# Patient Record
Sex: FEMALE | Race: WHITE
[De-identification: names, ages, dates, MRNs, and addresses within clinical notes are randomized per-mention and may not be internally consistent; named-entity substitution may affect disease eponyms.]

---

## 2020-08-10 ENCOUNTER — HOSPITAL ENCOUNTER (INPATIENT)
Dept: HOSPITAL 4 - SED | Age: 65
LOS: 6 days | Discharge: NURSING, REHAB OR LONG-TERM CARE | DRG: 871 | End: 2020-08-16
Attending: FAMILY MEDICINE | Admitting: FAMILY MEDICINE
Payer: COMMERCIAL

## 2020-08-10 VITALS — WEIGHT: 168.44 LBS | HEIGHT: 64 IN | SYSTOLIC BLOOD PRESSURE: 102 MMHG | BODY MASS INDEX: 28.76 KG/M2

## 2020-08-10 VITALS — SYSTOLIC BLOOD PRESSURE: 121 MMHG

## 2020-08-10 VITALS — SYSTOLIC BLOOD PRESSURE: 112 MMHG

## 2020-08-10 DIAGNOSIS — N18.5: ICD-10-CM

## 2020-08-10 DIAGNOSIS — K62.5: ICD-10-CM

## 2020-08-10 DIAGNOSIS — D63.8: ICD-10-CM

## 2020-08-10 DIAGNOSIS — K29.70: ICD-10-CM

## 2020-08-10 DIAGNOSIS — Z53.29: ICD-10-CM

## 2020-08-10 DIAGNOSIS — I48.0: ICD-10-CM

## 2020-08-10 DIAGNOSIS — E43: ICD-10-CM

## 2020-08-10 DIAGNOSIS — E03.9: ICD-10-CM

## 2020-08-10 DIAGNOSIS — F03.90: ICD-10-CM

## 2020-08-10 DIAGNOSIS — Z88.8: ICD-10-CM

## 2020-08-10 DIAGNOSIS — Z87.440: ICD-10-CM

## 2020-08-10 DIAGNOSIS — M54.9: ICD-10-CM

## 2020-08-10 DIAGNOSIS — I12.0: ICD-10-CM

## 2020-08-10 DIAGNOSIS — A41.02: Primary | ICD-10-CM

## 2020-08-10 DIAGNOSIS — G92: ICD-10-CM

## 2020-08-10 DIAGNOSIS — N31.9: ICD-10-CM

## 2020-08-10 DIAGNOSIS — E87.2: ICD-10-CM

## 2020-08-10 DIAGNOSIS — N17.9: ICD-10-CM

## 2020-08-10 DIAGNOSIS — K44.9: ICD-10-CM

## 2020-08-10 DIAGNOSIS — E78.5: ICD-10-CM

## 2020-08-10 DIAGNOSIS — E87.5: ICD-10-CM

## 2020-08-10 DIAGNOSIS — Z79.899: ICD-10-CM

## 2020-08-10 DIAGNOSIS — E86.0: ICD-10-CM

## 2020-08-10 DIAGNOSIS — N39.0: ICD-10-CM

## 2020-08-10 DIAGNOSIS — G89.29: ICD-10-CM

## 2020-08-10 DIAGNOSIS — F31.9: ICD-10-CM

## 2020-08-10 DIAGNOSIS — Z03.818: ICD-10-CM

## 2020-08-10 LAB
ALBUMIN SERPL BCP-MCNC: 3 G/DL (ref 3.4–4.8)
ALT SERPL W P-5'-P-CCNC: 23 U/L (ref 12–78)
ANION GAP SERPL CALCULATED.3IONS-SCNC: 21 MMOL/L (ref 5–15)
APPEARANCE UR: (no result)
AST SERPL W P-5'-P-CCNC: 20 U/L (ref 10–37)
BACTERIA URNS QL MICRO: (no result) /HPF
BASOPHILS # BLD AUTO: 0 K/UL (ref 0–0.2)
BASOPHILS NFR BLD AUTO: 0.1 % (ref 0–2)
BILIRUB SERPL-MCNC: 0.3 MG/DL (ref 0–1)
BILIRUB UR QL STRIP: NEGATIVE
BUN SERPL-MCNC: 104 MG/DL (ref 8–21)
CALCIUM SERPL-MCNC: 9.8 MG/DL (ref 8.4–11)
CHLORIDE SERPL-SCNC: 99 MMOL/L (ref 98–107)
COLOR UR: YELLOW
CREAT SERPL-MCNC: 10.8 MG/DL (ref 0.55–1.3)
EOSINOPHIL # BLD AUTO: 0 K/UL (ref 0–0.4)
EOSINOPHIL NFR BLD AUTO: 0 % (ref 0–4)
ERYTHROCYTE [DISTWIDTH] IN BLOOD BY AUTOMATED COUNT: 16 % (ref 9–15)
GFR SERPL CREATININE-BSD FRML MDRD: 5 ML/MIN (ref 90–?)
GLUCOSE SERPL-MCNC: 128 MG/DL (ref 70–99)
GLUCOSE UR STRIP-MCNC: NEGATIVE MG/DL
HCT VFR BLD AUTO: 32.3 % (ref 36–48)
HGB BLD-MCNC: 10.1 G/DL (ref 12–16)
HGB UR QL STRIP: (no result)
INR PPP: 0.9 (ref 0.8–1.2)
KETONES UR STRIP-MCNC: (no result) MG/DL
LEUKOCYTE ESTERASE UR QL STRIP: (no result)
LYMPHOCYTES # BLD AUTO: 0.7 K/UL (ref 1–5.5)
LYMPHOCYTES NFR BLD AUTO: 5.8 % (ref 20.5–51.5)
MCH RBC QN AUTO: 30 PG (ref 27–31)
MCHC RBC AUTO-ENTMCNC: 31 % (ref 32–36)
MCV RBC AUTO: 95 FL (ref 79–98)
MONOCYTES # BLD MANUAL: 0.4 K/UL (ref 0–1)
MONOCYTES # BLD MANUAL: 3.3 % (ref 1.7–9.3)
NEUTROPHILS # BLD AUTO: 11.2 K/UL (ref 1.8–7.7)
NEUTROPHILS NFR BLD AUTO: 90.8 % (ref 40–70)
NITRITE UR QL STRIP: NEGATIVE
PH UR STRIP: 6.5 [PH] (ref 5–8)
PLATELET # BLD AUTO: 340 K/UL (ref 130–430)
POTASSIUM SERPL-SCNC: 5.3 MMOL/L (ref 3.5–5.1)
PROT UR QL STRIP: (no result)
PROTHROMBIN TIME: 9.4 SECS (ref 9.5–12.5)
RBC # BLD AUTO: 3.38 MIL/UL (ref 4.2–6.2)
RBC #/AREA URNS HPF: (no result) /HPF (ref 0–3)
SODIUM SERPLBLD-SCNC: 131 MMOL/L (ref 136–145)
SP GR UR STRIP: 1.01 (ref 1–1.03)
UROBILINOGEN UR STRIP-MCNC: 0.2 MG/DL (ref 0.2–1)
WBC # BLD AUTO: 12.3 K/UL (ref 4.8–10.8)
WBC #/AREA URNS HPF: >100 /HPF (ref 0–3)

## 2020-08-10 PROCEDURE — C9113 INJ PANTOPRAZOLE SODIUM, VIA: HCPCS

## 2020-08-10 PROCEDURE — C1751 CATH, INF, PER/CENT/MIDLINE: HCPCS

## 2020-08-10 PROCEDURE — G0378 HOSPITAL OBSERVATION PER HR: HCPCS

## 2020-08-10 RX ADMIN — DEXTROSE AND SODIUM CHLORIDE SCH MLS/HR: 5; 900 INJECTION, SOLUTION INTRAVENOUS at 18:20

## 2020-08-10 RX ADMIN — SODIUM BICARBONATE TAB 650 MG SCH MG: 650 TAB at 21:09

## 2020-08-11 VITALS — SYSTOLIC BLOOD PRESSURE: 125 MMHG

## 2020-08-11 VITALS — SYSTOLIC BLOOD PRESSURE: 109 MMHG

## 2020-08-11 VITALS — SYSTOLIC BLOOD PRESSURE: 112 MMHG

## 2020-08-11 VITALS — SYSTOLIC BLOOD PRESSURE: 108 MMHG

## 2020-08-11 VITALS — SYSTOLIC BLOOD PRESSURE: 105 MMHG

## 2020-08-11 LAB
ALBUMIN SERPL BCP-MCNC: 2.4 G/DL (ref 3.4–4.8)
ALT SERPL W P-5'-P-CCNC: 22 U/L (ref 12–78)
ANION GAP SERPL CALCULATED.3IONS-SCNC: 17 MMOL/L (ref 5–15)
AST SERPL W P-5'-P-CCNC: 24 U/L (ref 10–37)
BASOPHILS # BLD AUTO: 0 K/UL (ref 0–0.2)
BASOPHILS NFR BLD AUTO: 0.1 % (ref 0–2)
BILIRUB SERPL-MCNC: 0.2 MG/DL (ref 0–1)
BUN SERPL-MCNC: 90 MG/DL (ref 8–21)
CALCIUM SERPL-MCNC: 8.6 MG/DL (ref 8.4–11)
CHLORIDE SERPL-SCNC: 108 MMOL/L (ref 98–107)
CREAT SERPL-MCNC: 9.62 MG/DL (ref 0.55–1.3)
EOSINOPHIL # BLD AUTO: 0 K/UL (ref 0–0.4)
EOSINOPHIL NFR BLD AUTO: 0 % (ref 0–4)
ERYTHROCYTE [DISTWIDTH] IN BLOOD BY AUTOMATED COUNT: 15.8 % (ref 9–15)
GFR SERPL CREATININE-BSD FRML MDRD: 5 ML/MIN (ref 90–?)
GLUCOSE SERPL-MCNC: 106 MG/DL (ref 70–99)
HCT VFR BLD AUTO: 29 % (ref 36–48)
HGB BLD-MCNC: 9.2 G/DL (ref 12–16)
LYMPHOCYTES # BLD AUTO: 0.8 K/UL (ref 1–5.5)
LYMPHOCYTES NFR BLD AUTO: 7 % (ref 20.5–51.5)
MCH RBC QN AUTO: 30 PG (ref 27–31)
MCHC RBC AUTO-ENTMCNC: 32 % (ref 32–36)
MCV RBC AUTO: 95 FL (ref 79–98)
MONOCYTES # BLD MANUAL: 0.5 K/UL (ref 0–1)
MONOCYTES # BLD MANUAL: 4 % (ref 1.7–9.3)
NEUTROPHILS # BLD AUTO: 10.6 K/UL (ref 1.8–7.7)
NEUTROPHILS NFR BLD AUTO: 88.9 % (ref 40–70)
PHOSPHATE SERPL-MCNC: 4.7 MG/DL (ref 2.7–4.5)
PLATELET # BLD AUTO: 302 K/UL (ref 130–430)
POTASSIUM SERPL-SCNC: 5.2 MMOL/L (ref 3.5–5.1)
RBC # BLD AUTO: 3.06 MIL/UL (ref 4.2–6.2)
SODIUM SERPLBLD-SCNC: 140 MMOL/L (ref 136–145)
SODIUM UR-SCNC: 69 MMOL/L (ref 40–220)
TSH SERPL DL<=0.05 MIU/L-ACNC: 0.41 UIU/ML (ref 0.36–3.74)
WBC # BLD AUTO: 11.9 K/UL (ref 4.8–10.8)

## 2020-08-11 RX ADMIN — SODIUM BICARBONATE TAB 650 MG SCH MG: 650 TAB at 15:45

## 2020-08-11 RX ADMIN — DEXTROSE AND SODIUM CHLORIDE SCH MLS/HR: 5; 900 INJECTION, SOLUTION INTRAVENOUS at 01:30

## 2020-08-11 RX ADMIN — SODIUM BICARBONATE TAB 650 MG SCH MG: 650 TAB at 20:57

## 2020-08-11 RX ADMIN — DEXTROSE AND SODIUM CHLORIDE SCH MLS/HR: 5; 900 INJECTION, SOLUTION INTRAVENOUS at 18:05

## 2020-08-11 RX ADMIN — SODIUM BICARBONATE TAB 650 MG SCH MG: 650 TAB at 09:10

## 2020-08-11 RX ADMIN — DEXTROSE MONOHYDRATE SCH MLS/HR: 50 INJECTION, SOLUTION INTRAVENOUS at 15:44

## 2020-08-11 RX ADMIN — DEXTROSE AND SODIUM CHLORIDE SCH MLS/HR: 5; 900 INJECTION, SOLUTION INTRAVENOUS at 09:10

## 2020-08-11 RX ADMIN — LEVOTHYROXINE SODIUM SCH MG: 100 TABLET ORAL at 06:01

## 2020-08-12 VITALS — SYSTOLIC BLOOD PRESSURE: 115 MMHG

## 2020-08-12 VITALS — SYSTOLIC BLOOD PRESSURE: 101 MMHG

## 2020-08-12 VITALS — SYSTOLIC BLOOD PRESSURE: 126 MMHG

## 2020-08-12 VITALS — SYSTOLIC BLOOD PRESSURE: 118 MMHG

## 2020-08-12 VITALS — SYSTOLIC BLOOD PRESSURE: 96 MMHG

## 2020-08-12 LAB
ALBUMIN SERPL BCP-MCNC: 2.3 G/DL (ref 3.4–4.8)
ALT SERPL W P-5'-P-CCNC: 30 U/L (ref 12–78)
ANION GAP SERPL CALCULATED.3IONS-SCNC: 20 MMOL/L (ref 5–15)
ANION GAP SERPL CALCULATED.3IONS-SCNC: 21 MMOL/L (ref 5–15)
AST SERPL W P-5'-P-CCNC: 37 U/L (ref 10–37)
BASOPHILS # BLD AUTO: 0 K/UL (ref 0–0.2)
BASOPHILS # BLD AUTO: 0 K/UL (ref 0–0.2)
BASOPHILS NFR BLD AUTO: 0.2 % (ref 0–2)
BASOPHILS NFR BLD AUTO: 0.2 % (ref 0–2)
BILIRUB SERPL-MCNC: 0.2 MG/DL (ref 0–1)
BUN SERPL-MCNC: 80 MG/DL (ref 8–21)
BUN SERPL-MCNC: 82 MG/DL (ref 8–21)
CALCIUM SERPL-MCNC: 8.6 MG/DL (ref 8.4–11)
CALCIUM SERPL-MCNC: 8.6 MG/DL (ref 8.4–11)
CHLORIDE SERPL-SCNC: 106 MMOL/L (ref 98–107)
CHLORIDE SERPL-SCNC: 107 MMOL/L (ref 98–107)
CREAT SERPL-MCNC: 8.28 MG/DL (ref 0.55–1.3)
CREAT SERPL-MCNC: 8.46 MG/DL (ref 0.55–1.3)
EOSINOPHIL # BLD AUTO: 0 K/UL (ref 0–0.4)
EOSINOPHIL # BLD AUTO: 0.1 K/UL (ref 0–0.4)
EOSINOPHIL NFR BLD AUTO: 0.5 % (ref 0–4)
EOSINOPHIL NFR BLD AUTO: 1.3 % (ref 0–4)
ERYTHROCYTE [DISTWIDTH] IN BLOOD BY AUTOMATED COUNT: 16.4 % (ref 9–15)
ERYTHROCYTE [DISTWIDTH] IN BLOOD BY AUTOMATED COUNT: 16.6 % (ref 9–15)
GFR SERPL CREATININE-BSD FRML MDRD: 6 ML/MIN (ref 90–?)
GFR SERPL CREATININE-BSD FRML MDRD: 6 ML/MIN (ref 90–?)
GLUCOSE SERPL-MCNC: 85 MG/DL (ref 70–99)
GLUCOSE SERPL-MCNC: 87 MG/DL (ref 70–99)
HCT VFR BLD AUTO: 28 % (ref 36–48)
HCT VFR BLD AUTO: 28.1 % (ref 36–48)
HGB BLD-MCNC: 8.7 G/DL (ref 12–16)
HGB BLD-MCNC: 9.1 G/DL (ref 12–16)
LYMPHOCYTES # BLD AUTO: 1.2 K/UL (ref 1–5.5)
LYMPHOCYTES # BLD AUTO: 1.3 K/UL (ref 1–5.5)
LYMPHOCYTES NFR BLD AUTO: 15.4 % (ref 20.5–51.5)
LYMPHOCYTES NFR BLD AUTO: 18.3 % (ref 20.5–51.5)
MCH RBC QN AUTO: 30 PG (ref 27–31)
MCH RBC QN AUTO: 31 PG (ref 27–31)
MCHC RBC AUTO-ENTMCNC: 31 % (ref 32–36)
MCHC RBC AUTO-ENTMCNC: 32 % (ref 32–36)
MCV RBC AUTO: 94 FL (ref 79–98)
MCV RBC AUTO: 95 FL (ref 79–98)
MONOCYTES # BLD MANUAL: 0.3 K/UL (ref 0–1)
MONOCYTES # BLD MANUAL: 0.4 K/UL (ref 0–1)
MONOCYTES # BLD MANUAL: 4.7 % (ref 1.7–9.3)
MONOCYTES # BLD MANUAL: 5 % (ref 1.7–9.3)
NEUTROPHILS # BLD AUTO: 5 K/UL (ref 1.8–7.7)
NEUTROPHILS # BLD AUTO: 6.4 K/UL (ref 1.8–7.7)
NEUTROPHILS NFR BLD AUTO: 75.2 % (ref 40–70)
NEUTROPHILS NFR BLD AUTO: 79.2 % (ref 40–70)
PLATELET # BLD AUTO: 273 K/UL (ref 130–430)
PLATELET # BLD AUTO: 277 K/UL (ref 130–430)
POTASSIUM SERPL-SCNC: 4.1 MMOL/L (ref 3.5–5.1)
POTASSIUM SERPL-SCNC: 4.2 MMOL/L (ref 3.5–5.1)
RBC # BLD AUTO: 2.96 MIL/UL (ref 4.2–6.2)
RBC # BLD AUTO: 2.96 MIL/UL (ref 4.2–6.2)
SODIUM SERPLBLD-SCNC: 140 MMOL/L (ref 136–145)
SODIUM SERPLBLD-SCNC: 141 MMOL/L (ref 136–145)
WBC # BLD AUTO: 6.6 K/UL (ref 4.8–10.8)
WBC # BLD AUTO: 8.1 K/UL (ref 4.8–10.8)

## 2020-08-12 RX ADMIN — DEXTROSE MONOHYDRATE SCH MLS/HR: 50 INJECTION, SOLUTION INTRAVENOUS at 14:34

## 2020-08-12 RX ADMIN — LEVOTHYROXINE SODIUM SCH MG: 100 TABLET ORAL at 06:38

## 2020-08-12 RX ADMIN — SODIUM BICARBONATE TAB 650 MG SCH MG: 650 TAB at 09:15

## 2020-08-12 RX ADMIN — DEXTROSE AND SODIUM CHLORIDE SCH MLS/HR: 5; 900 INJECTION, SOLUTION INTRAVENOUS at 14:35

## 2020-08-12 RX ADMIN — SODIUM BICARBONATE TAB 650 MG SCH MG: 650 TAB at 14:34

## 2020-08-12 RX ADMIN — HYDROMORPHONE HYDROCHLORIDE PRN MG: 8 TABLET ORAL at 20:11

## 2020-08-12 RX ADMIN — DEXTROSE AND SODIUM CHLORIDE SCH MLS/HR: 5; 900 INJECTION, SOLUTION INTRAVENOUS at 01:30

## 2020-08-12 RX ADMIN — SODIUM BICARBONATE TAB 650 MG SCH MG: 650 TAB at 20:08

## 2020-08-12 RX ADMIN — DEXTROSE AND SODIUM CHLORIDE SCH MLS/HR: 5; 900 INJECTION, SOLUTION INTRAVENOUS at 17:30

## 2020-08-13 VITALS — SYSTOLIC BLOOD PRESSURE: 118 MMHG

## 2020-08-13 VITALS — SYSTOLIC BLOOD PRESSURE: 111 MMHG

## 2020-08-13 VITALS — SYSTOLIC BLOOD PRESSURE: 122 MMHG

## 2020-08-13 VITALS — SYSTOLIC BLOOD PRESSURE: 110 MMHG

## 2020-08-13 VITALS — SYSTOLIC BLOOD PRESSURE: 107 MMHG

## 2020-08-13 LAB
ALBUMIN SERPL BCP-MCNC: 1.9 G/DL (ref 3.4–4.8)
ALT SERPL W P-5'-P-CCNC: 26 U/L (ref 12–78)
ANION GAP SERPL CALCULATED.3IONS-SCNC: 17 MMOL/L (ref 5–15)
AST SERPL W P-5'-P-CCNC: 27 U/L (ref 10–37)
BASOPHILS # BLD AUTO: 0 K/UL (ref 0–0.2)
BASOPHILS NFR BLD AUTO: 0.6 % (ref 0–2)
BILIRUB SERPL-MCNC: 0.2 MG/DL (ref 0–1)
BUN SERPL-MCNC: 78 MG/DL (ref 8–21)
CALCIUM SERPL-MCNC: 7.7 MG/DL (ref 8.4–11)
CHLORIDE SERPL-SCNC: 108 MMOL/L (ref 98–107)
CREAT SERPL-MCNC: 7.36 MG/DL (ref 0.55–1.3)
CREAT UR-MCNC: 103 MG/DL
EOSINOPHIL # BLD AUTO: 0.2 K/UL (ref 0–0.4)
EOSINOPHIL NFR BLD AUTO: 3 % (ref 0–4)
ERYTHROCYTE [DISTWIDTH] IN BLOOD BY AUTOMATED COUNT: 16.3 % (ref 9–15)
GFR SERPL CREATININE-BSD FRML MDRD: 7 ML/MIN (ref 90–?)
GLUCOSE SERPL-MCNC: 106 MG/DL (ref 70–99)
HCT VFR BLD AUTO: 22.9 % (ref 36–48)
HGB BLD-MCNC: 7.3 G/DL (ref 12–16)
LYMPHOCYTES # BLD AUTO: 1.1 K/UL (ref 1–5.5)
LYMPHOCYTES NFR BLD AUTO: 20.7 % (ref 20.5–51.5)
MCH RBC QN AUTO: 30 PG (ref 27–31)
MCHC RBC AUTO-ENTMCNC: 32 % (ref 32–36)
MCV RBC AUTO: 94 FL (ref 79–98)
MICROALBUMIN UR-MCNC: 247.5 UG/ML
MICROALBUMIN/CREAT UR: 240 MG/G CRE (ref 0–30)
MONOCYTES # BLD MANUAL: 0.5 K/UL (ref 0–1)
MONOCYTES # BLD MANUAL: 8.6 % (ref 1.7–9.3)
NEUTROPHILS # BLD AUTO: 3.7 K/UL (ref 1.8–7.7)
NEUTROPHILS NFR BLD AUTO: 67.1 % (ref 40–70)
PLATELET # BLD AUTO: 245 K/UL (ref 130–430)
POTASSIUM SERPL-SCNC: 3.5 MMOL/L (ref 3.5–5.1)
RBC # BLD AUTO: 2.45 MIL/UL (ref 4.2–6.2)
SODIUM SERPLBLD-SCNC: 141 MMOL/L (ref 136–145)
TIBC SERPL-MCNC: 85 UG/DL (ref 250–450)
WBC # BLD AUTO: 5.5 K/UL (ref 4.8–10.8)

## 2020-08-13 RX ADMIN — SODIUM BICARBONATE TAB 650 MG SCH MG: 650 TAB at 21:05

## 2020-08-13 RX ADMIN — HYDROMORPHONE HYDROCHLORIDE PRN MG: 8 TABLET ORAL at 14:48

## 2020-08-13 RX ADMIN — SODIUM CHLORIDE SCH MG: 9 INJECTION, SOLUTION INTRAVENOUS at 08:43

## 2020-08-13 RX ADMIN — ERYTHROPOIETIN SCH UNITS: 10000 INJECTION, SOLUTION INTRAVENOUS; SUBCUTANEOUS at 17:54

## 2020-08-13 RX ADMIN — LEVOTHYROXINE SODIUM SCH MG: 100 TABLET ORAL at 06:01

## 2020-08-13 RX ADMIN — DEXTROSE AND SODIUM CHLORIDE SCH MLS/HR: 5; 900 INJECTION, SOLUTION INTRAVENOUS at 08:44

## 2020-08-13 RX ADMIN — SODIUM BICARBONATE TAB 650 MG SCH MG: 650 TAB at 14:47

## 2020-08-13 RX ADMIN — DEXTROSE AND SODIUM CHLORIDE SCH MLS/HR: 5; 900 INJECTION, SOLUTION INTRAVENOUS at 00:30

## 2020-08-13 RX ADMIN — HYDROMORPHONE HYDROCHLORIDE PRN MG: 8 TABLET ORAL at 21:08

## 2020-08-13 RX ADMIN — DEXTROSE MONOHYDRATE SCH MLS/HR: 50 INJECTION, SOLUTION INTRAVENOUS at 14:47

## 2020-08-13 RX ADMIN — DEXTROSE AND SODIUM CHLORIDE SCH MLS/HR: 5; 900 INJECTION, SOLUTION INTRAVENOUS at 17:54

## 2020-08-13 RX ADMIN — SODIUM BICARBONATE TAB 650 MG SCH MG: 650 TAB at 08:43

## 2020-08-14 VITALS — SYSTOLIC BLOOD PRESSURE: 123 MMHG

## 2020-08-14 VITALS — SYSTOLIC BLOOD PRESSURE: 125 MMHG

## 2020-08-14 VITALS — SYSTOLIC BLOOD PRESSURE: 116 MMHG

## 2020-08-14 VITALS — SYSTOLIC BLOOD PRESSURE: 103 MMHG

## 2020-08-14 VITALS — SYSTOLIC BLOOD PRESSURE: 107 MMHG

## 2020-08-14 LAB
ANION GAP SERPL CALCULATED.3IONS-SCNC: 13 MMOL/L (ref 5–15)
BASOPHILS # BLD AUTO: 0 K/UL (ref 0–0.2)
BASOPHILS NFR BLD AUTO: 0.3 % (ref 0–2)
BUN SERPL-MCNC: 66 MG/DL (ref 8–21)
CALCIUM SERPL-MCNC: 7.8 MG/DL (ref 8.4–11)
CHLORIDE SERPL-SCNC: 109 MMOL/L (ref 98–107)
CREAT SERPL-MCNC: 6.26 MG/DL (ref 0.55–1.3)
EOSINOPHIL # BLD AUTO: 0.2 K/UL (ref 0–0.4)
EOSINOPHIL NFR BLD AUTO: 4.7 % (ref 0–4)
ERYTHROCYTE [DISTWIDTH] IN BLOOD BY AUTOMATED COUNT: 16.5 % (ref 9–15)
FOLATE (FOLIC ACID): 12.5 NG/ML (ref 3–?)
GFR SERPL CREATININE-BSD FRML MDRD: 9 ML/MIN (ref 90–?)
GLUCOSE SERPL-MCNC: 108 MG/DL (ref 70–99)
HCT VFR BLD AUTO: 24.2 % (ref 36–48)
HGB BLD-MCNC: 7.6 G/DL (ref 12–16)
INR PPP: 0.9 (ref 0.8–1.2)
LYMPHOCYTES # BLD AUTO: 1.5 K/UL (ref 1–5.5)
LYMPHOCYTES NFR BLD AUTO: 31.3 % (ref 20.5–51.5)
MCH RBC QN AUTO: 30 PG (ref 27–31)
MCHC RBC AUTO-ENTMCNC: 31 % (ref 32–36)
MCV RBC AUTO: 95 FL (ref 79–98)
MONOCYTES # BLD MANUAL: 0.5 K/UL (ref 0–1)
MONOCYTES # BLD MANUAL: 9.7 % (ref 1.7–9.3)
NEUTROPHILS # BLD AUTO: 2.6 K/UL (ref 1.8–7.7)
NEUTROPHILS NFR BLD AUTO: 54 % (ref 40–70)
PLATELET # BLD AUTO: 251 K/UL (ref 130–430)
POTASSIUM SERPL-SCNC: 3.5 MMOL/L (ref 3.5–5.1)
PROTHROMBIN TIME: 9.5 SECS (ref 9.5–12.5)
RBC # BLD AUTO: 2.56 MIL/UL (ref 4.2–6.2)
SODIUM SERPLBLD-SCNC: 139 MMOL/L (ref 136–145)
VIT B12 SERPL-MCNC: 1015 PG/ML (ref 232–1245)
WBC # BLD AUTO: 4.9 K/UL (ref 4.8–10.8)

## 2020-08-14 PROCEDURE — 0DB68ZX EXCISION OF STOMACH, VIA NATURAL OR ARTIFICIAL OPENING ENDOSCOPIC, DIAGNOSTIC: ICD-10-PCS | Performed by: INTERNAL MEDICINE

## 2020-08-14 PROCEDURE — 0DB98ZX EXCISION OF DUODENUM, VIA NATURAL OR ARTIFICIAL OPENING ENDOSCOPIC, DIAGNOSTIC: ICD-10-PCS | Performed by: INTERNAL MEDICINE

## 2020-08-14 RX ADMIN — MIDAZOLAM HYDROCHLORIDE ONE MG: 1 INJECTION, SOLUTION INTRAMUSCULAR; INTRAVENOUS at 11:05

## 2020-08-14 RX ADMIN — SODIUM CHLORIDE SCH MG: 9 INJECTION, SOLUTION INTRAVENOUS at 09:07

## 2020-08-14 RX ADMIN — SODIUM BICARBONATE TAB 650 MG SCH MG: 650 TAB at 09:00

## 2020-08-14 RX ADMIN — MIDAZOLAM HYDROCHLORIDE ONE MG: 1 INJECTION, SOLUTION INTRAMUSCULAR; INTRAVENOUS at 11:10

## 2020-08-14 RX ADMIN — DEXTROSE AND SODIUM CHLORIDE SCH MLS/HR: 5; 900 INJECTION, SOLUTION INTRAVENOUS at 01:31

## 2020-08-14 RX ADMIN — MEPERIDINE HYDROCHLORIDE ONE MG: 100 INJECTION INTRAMUSCULAR; INTRAVENOUS; SUBCUTANEOUS at 11:05

## 2020-08-14 RX ADMIN — MEPERIDINE HYDROCHLORIDE ONE MG: 100 INJECTION INTRAMUSCULAR; INTRAVENOUS; SUBCUTANEOUS at 11:10

## 2020-08-14 RX ADMIN — DEXTROSE AND SODIUM CHLORIDE SCH MLS/HR: 5; 900 INJECTION, SOLUTION INTRAVENOUS at 09:08

## 2020-08-14 RX ADMIN — LEVOTHYROXINE SODIUM SCH MG: 100 TABLET ORAL at 06:32

## 2020-08-14 RX ADMIN — SODIUM BICARBONATE TAB 650 MG SCH MG: 650 TAB at 20:27

## 2020-08-14 RX ADMIN — LINEZOLID SCH MG: 600 TABLET, FILM COATED ORAL at 20:27

## 2020-08-14 RX ADMIN — SODIUM BICARBONATE TAB 650 MG SCH MG: 650 TAB at 14:03

## 2020-08-15 VITALS — SYSTOLIC BLOOD PRESSURE: 106 MMHG

## 2020-08-15 VITALS — SYSTOLIC BLOOD PRESSURE: 107 MMHG

## 2020-08-15 VITALS — SYSTOLIC BLOOD PRESSURE: 108 MMHG

## 2020-08-15 VITALS — SYSTOLIC BLOOD PRESSURE: 120 MMHG

## 2020-08-15 VITALS — SYSTOLIC BLOOD PRESSURE: 103 MMHG

## 2020-08-15 LAB
ANION GAP SERPL CALCULATED.3IONS-SCNC: 14 MMOL/L (ref 5–15)
BASOPHILS # BLD AUTO: 0 K/UL (ref 0–0.2)
BASOPHILS NFR BLD AUTO: 0.6 % (ref 0–2)
BUN SERPL-MCNC: 59 MG/DL (ref 8–21)
CALCIUM SERPL-MCNC: 8.4 MG/DL (ref 8.4–11)
CHLORIDE SERPL-SCNC: 107 MMOL/L (ref 98–107)
CREAT SERPL-MCNC: 5.79 MG/DL (ref 0.55–1.3)
EOSINOPHIL # BLD AUTO: 0.2 K/UL (ref 0–0.4)
EOSINOPHIL NFR BLD AUTO: 5.2 % (ref 0–4)
ERYTHROCYTE [DISTWIDTH] IN BLOOD BY AUTOMATED COUNT: 16.5 % (ref 9–15)
FERRITIN: 647 NG/ML (ref 15–150)
GFR SERPL CREATININE-BSD FRML MDRD: 9 ML/MIN (ref 90–?)
GLUCOSE SERPL-MCNC: 93 MG/DL (ref 70–99)
HCT VFR BLD AUTO: 25.3 % (ref 36–48)
HGB BLD-MCNC: 7.8 G/DL (ref 12–16)
LYMPHOCYTES # BLD AUTO: 1.1 K/UL (ref 1–5.5)
LYMPHOCYTES NFR BLD AUTO: 28.2 % (ref 20.5–51.5)
MCH RBC QN AUTO: 29 PG (ref 27–31)
MCHC RBC AUTO-ENTMCNC: 31 % (ref 32–36)
MCV RBC AUTO: 95 FL (ref 79–98)
MONOCYTES # BLD MANUAL: 0.4 K/UL (ref 0–1)
MONOCYTES # BLD MANUAL: 9.1 % (ref 1.7–9.3)
NEUTROPHILS # BLD AUTO: 2.2 K/UL (ref 1.8–7.7)
NEUTROPHILS NFR BLD AUTO: 56.9 % (ref 40–70)
PLATELET # BLD AUTO: 237 K/UL (ref 130–430)
POTASSIUM SERPL-SCNC: 3.4 MMOL/L (ref 3.5–5.1)
RBC # BLD AUTO: 2.67 MIL/UL (ref 4.2–6.2)
SODIUM SERPLBLD-SCNC: 137 MMOL/L (ref 136–145)
WBC # BLD AUTO: 3.9 K/UL (ref 4.8–10.8)

## 2020-08-15 PROCEDURE — B548ZZA ULTRASONOGRAPHY OF SUPERIOR VENA CAVA, GUIDANCE: ICD-10-PCS

## 2020-08-15 PROCEDURE — 0JH63XZ INSERTION OF TUNNELED VASCULAR ACCESS DEVICE INTO CHEST SUBCUTANEOUS TISSUE AND FASCIA, PERCUTANEOUS APPROACH: ICD-10-PCS

## 2020-08-15 PROCEDURE — 5A1D70Z PERFORMANCE OF URINARY FILTRATION, INTERMITTENT, LESS THAN 6 HOURS PER DAY: ICD-10-PCS | Performed by: FAMILY MEDICINE

## 2020-08-15 PROCEDURE — 02H633Z INSERTION OF INFUSION DEVICE INTO RIGHT ATRIUM, PERCUTANEOUS APPROACH: ICD-10-PCS

## 2020-08-15 PROCEDURE — B518ZZA FLUOROSCOPY OF SUPERIOR VENA CAVA, GUIDANCE: ICD-10-PCS

## 2020-08-15 RX ADMIN — SODIUM CHLORIDE SCH MG: 9 INJECTION, SOLUTION INTRAVENOUS at 08:53

## 2020-08-15 RX ADMIN — SODIUM BICARBONATE TAB 650 MG SCH MG: 650 TAB at 08:53

## 2020-08-15 RX ADMIN — LEVOTHYROXINE SODIUM SCH MG: 100 TABLET ORAL at 06:30

## 2020-08-15 RX ADMIN — HYDROMORPHONE HYDROCHLORIDE PRN MG: 8 TABLET ORAL at 10:28

## 2020-08-15 RX ADMIN — LINEZOLID SCH MG: 600 TABLET, FILM COATED ORAL at 08:53

## 2020-08-15 RX ADMIN — SODIUM BICARBONATE TAB 650 MG SCH MG: 650 TAB at 13:38

## 2020-08-16 VITALS — SYSTOLIC BLOOD PRESSURE: 90 MMHG

## 2020-08-16 VITALS — SYSTOLIC BLOOD PRESSURE: 92 MMHG

## 2020-08-16 VITALS — SYSTOLIC BLOOD PRESSURE: 94 MMHG

## 2020-08-16 VITALS — SYSTOLIC BLOOD PRESSURE: 128 MMHG

## 2020-08-16 VITALS — SYSTOLIC BLOOD PRESSURE: 99 MMHG

## 2020-08-16 LAB
ANION GAP SERPL CALCULATED.3IONS-SCNC: 7 MMOL/L (ref 5–15)
BASOPHILS # BLD AUTO: 0 K/UL (ref 0–0.2)
BASOPHILS NFR BLD AUTO: 0.4 % (ref 0–2)
BUN SERPL-MCNC: 20 MG/DL (ref 8–21)
CALCIUM SERPL-MCNC: 7.7 MG/DL (ref 8.4–11)
CHLORIDE SERPL-SCNC: 105 MMOL/L (ref 98–107)
CREAT SERPL-MCNC: 2.73 MG/DL (ref 0.55–1.3)
EOSINOPHIL # BLD AUTO: 0.1 K/UL (ref 0–0.4)
EOSINOPHIL NFR BLD AUTO: 2.8 % (ref 0–4)
ERYTHROCYTE [DISTWIDTH] IN BLOOD BY AUTOMATED COUNT: 16 % (ref 9–15)
GFR SERPL CREATININE-BSD FRML MDRD: 22 ML/MIN (ref 90–?)
GLUCOSE SERPL-MCNC: 87 MG/DL (ref 70–99)
HCT VFR BLD AUTO: 23.8 % (ref 36–48)
HGB BLD-MCNC: 7.5 G/DL (ref 12–16)
LYMPHOCYTES # BLD AUTO: 1 K/UL (ref 1–5.5)
LYMPHOCYTES NFR BLD AUTO: 24.9 % (ref 20.5–51.5)
MCH RBC QN AUTO: 29 PG (ref 27–31)
MCHC RBC AUTO-ENTMCNC: 31 % (ref 32–36)
MCV RBC AUTO: 93 FL (ref 79–98)
MONOCYTES # BLD MANUAL: 0.5 K/UL (ref 0–1)
MONOCYTES # BLD MANUAL: 11.8 % (ref 1.7–9.3)
NEUTROPHILS # BLD AUTO: 2.4 K/UL (ref 1.8–7.7)
NEUTROPHILS NFR BLD AUTO: 60.1 % (ref 40–70)
PLATELET # BLD AUTO: 237 K/UL (ref 130–430)
POTASSIUM SERPL-SCNC: 3.1 MMOL/L (ref 3.5–5.1)
RBC # BLD AUTO: 2.57 MIL/UL (ref 4.2–6.2)
SODIUM SERPLBLD-SCNC: 139 MMOL/L (ref 136–145)
WBC # BLD AUTO: 4.1 K/UL (ref 4.8–10.8)

## 2020-08-16 RX ADMIN — ERYTHROPOIETIN SCH UNITS: 10000 INJECTION, SOLUTION INTRAVENOUS; SUBCUTANEOUS at 00:17

## 2020-08-16 RX ADMIN — SODIUM CHLORIDE SCH MG: 9 INJECTION, SOLUTION INTRAVENOUS at 08:05

## 2020-08-16 RX ADMIN — LEVOTHYROXINE SODIUM SCH MG: 100 TABLET ORAL at 06:05

## 2020-08-16 RX ADMIN — LINEZOLID SCH MG: 600 TABLET, FILM COATED ORAL at 08:05

## 2020-08-16 RX ADMIN — SODIUM BICARBONATE TAB 650 MG SCH MG: 650 TAB at 16:21

## 2020-08-16 RX ADMIN — SODIUM BICARBONATE TAB 650 MG SCH MG: 650 TAB at 00:15

## 2020-08-16 RX ADMIN — SODIUM BICARBONATE TAB 650 MG SCH MG: 650 TAB at 08:06

## 2020-08-16 RX ADMIN — LINEZOLID SCH MG: 600 TABLET, FILM COATED ORAL at 00:18

## 2020-08-16 RX ADMIN — HYDROMORPHONE HYDROCHLORIDE PRN MG: 8 TABLET ORAL at 00:18

## 2021-07-22 ENCOUNTER — HOSPITAL ENCOUNTER (INPATIENT)
Dept: HOSPITAL 4 - SED | Age: 66
LOS: 3 days | Discharge: HOME | DRG: 673 | End: 2021-07-25
Attending: FAMILY MEDICINE | Admitting: FAMILY MEDICINE
Payer: COMMERCIAL

## 2021-07-22 VITALS — SYSTOLIC BLOOD PRESSURE: 106 MMHG

## 2021-07-22 VITALS — WEIGHT: 186.31 LBS | HEIGHT: 62 IN | SYSTOLIC BLOOD PRESSURE: 117 MMHG | BODY MASS INDEX: 34.29 KG/M2

## 2021-07-22 VITALS — SYSTOLIC BLOOD PRESSURE: 134 MMHG

## 2021-07-22 VITALS — SYSTOLIC BLOOD PRESSURE: 143 MMHG

## 2021-07-22 DIAGNOSIS — N18.6: ICD-10-CM

## 2021-07-22 DIAGNOSIS — Z20.822: ICD-10-CM

## 2021-07-22 DIAGNOSIS — D64.9: ICD-10-CM

## 2021-07-22 DIAGNOSIS — E03.9: ICD-10-CM
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
## 2021-07-22 DIAGNOSIS — E78.5: ICD-10-CM

## 2021-07-22 DIAGNOSIS — D72.819: ICD-10-CM
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
## 2021-07-22 DIAGNOSIS — E83.42: ICD-10-CM

## 2021-07-22 DIAGNOSIS — T82.510A: ICD-10-CM

## 2021-07-22 DIAGNOSIS — Z99.2: ICD-10-CM

## 2021-07-22 DIAGNOSIS — M89.9: ICD-10-CM

## 2021-07-22 DIAGNOSIS — T82.41XA: Primary | ICD-10-CM

## 2021-07-22 DIAGNOSIS — Z79.890: ICD-10-CM

## 2021-07-22 DIAGNOSIS — I12.0: ICD-10-CM

## 2021-07-22 LAB
ALBUMIN SERPL BCP-MCNC: 3.7 G/DL (ref 3.4–4.8)
ALT SERPL W P-5'-P-CCNC: 19 U/L (ref 12–78)
ANION GAP SERPL CALCULATED.3IONS-SCNC: 10 MMOL/L (ref 5–15)
AST SERPL W P-5'-P-CCNC: 18 U/L (ref 10–37)
BASOPHILS # BLD AUTO: 0 K/UL (ref 0–0.2)
BASOPHILS NFR BLD AUTO: 0.6 % (ref 0–2)
BILIRUB SERPL-MCNC: 0.1 MG/DL (ref 0–1)
BUN SERPL-MCNC: 47 MG/DL (ref 8–21)
CALCIUM SERPL-MCNC: 8.8 MG/DL (ref 8.4–11)
CHLORIDE SERPL-SCNC: 101 MMOL/L (ref 98–107)
CREAT SERPL-MCNC: 5.09 MG/DL (ref 0.55–1.3)
EOSINOPHIL # BLD AUTO: 0.1 K/UL (ref 0–0.4)
EOSINOPHIL NFR BLD AUTO: 2.2 % (ref 0–4)
ERYTHROCYTE [DISTWIDTH] IN BLOOD BY AUTOMATED COUNT: 15.4 % (ref 9–15)
GFR SERPL CREATININE-BSD FRML MDRD: 11 ML/MIN (ref 90–?)
GLUCOSE SERPL-MCNC: 90 MG/DL (ref 70–99)
HCT VFR BLD AUTO: 36.6 % (ref 36–48)
HGB BLD-MCNC: 12 G/DL (ref 12–16)
INR PPP: 0.9 (ref 0.8–1.2)
LYMPHOCYTES # BLD AUTO: 1.8 K/UL (ref 1–5.5)
LYMPHOCYTES NFR BLD AUTO: 38.4 % (ref 20.5–51.5)
MCH RBC QN AUTO: 34 PG (ref 27–31)
MCHC RBC AUTO-ENTMCNC: 33 % (ref 32–36)
MCV RBC AUTO: 104 FL (ref 79–98)
MONOCYTES # BLD MANUAL: 0.4 K/UL (ref 0–1)
MONOCYTES # BLD MANUAL: 8.6 % (ref 1.7–9.3)
NEUTROPHILS # BLD AUTO: 2.3 K/UL (ref 1.8–7.7)
NEUTROPHILS NFR BLD AUTO: 50.2 % (ref 40–70)
PLATELET # BLD AUTO: 193 K/UL (ref 130–430)
POTASSIUM SERPL-SCNC: 4.7 MMOL/L (ref 3.5–5.1)
PROTHROMBIN TIME: 9.7 SECS (ref 9.5–12.5)
RBC # BLD AUTO: 3.52 MIL/UL (ref 4.2–6.2)
SODIUM SERPLBLD-SCNC: 136 MMOL/L (ref 136–145)
WBC # BLD AUTO: 4.6 K/UL (ref 4.8–10.8)

## 2021-07-22 PROCEDURE — C1750 CATH, HEMODIALYSIS,LONG-TERM: HCPCS

## 2021-07-22 PROCEDURE — 5A1D80Z PERFORMANCE OF URINARY FILTRATION, PROLONGED INTERMITTENT, 6-18 HOURS PER DAY: ICD-10-PCS | Performed by: INTERNAL MEDICINE

## 2021-07-22 RX ADMIN — SIMVASTATIN SCH MG: 10 TABLET, FILM COATED ORAL at 20:54

## 2021-07-22 RX ADMIN — Medication SCH ML: at 21:33

## 2021-07-22 RX ADMIN — OXCARBAZEPINE SCH MG: 150 TABLET, FILM COATED ORAL at 20:57

## 2021-07-22 RX ADMIN — SUCRALFATE SCH GM: 1 TABLET ORAL at 20:58

## 2021-07-22 RX ADMIN — SUCRALFATE SCH GM: 1 TABLET ORAL at 17:42

## 2021-07-22 NOTE — NUR
DR SHERIFF AT BEDSIDE

      DR SHERIFF CAME TO SEE PATIENT AND SUTURES THE PATIENT'S PERMACATHETER. FLUSHED AND CHECKED 
 PERMA CATH FOR OCCLUSION, NO OCCLUSION NOTED. RED FOR IN AND BLUE FOR OUT FOR DIALYSIS.

## 2021-07-22 NOTE — NUR
admission notes

rec patient from er with a dx of malfunction catheter via [place]ney. awake alert with ivl on 
the l hand intact. no inifltration noted. perma cath on the r upper chest intact. oriented 
with her surroundings and made patient comfortable. bed to the lowest positon and side rails 
up and locked. call light within reached. no sob noted.

## 2021-07-22 NOTE — NUR
CONSULTATION PAGED/CALLED

Reason for Consultation: []  MALFUNCTIONING HD CATHETER

Person Who was Notified: []  JAE

Consulting Physician: []   DR ISA SHERIFF

Consultant Specialty: []  GEN SURGEON

Ordering Physician: []  DR CEDRIC TIPTON

## 2021-07-22 NOTE — NUR
CONSULTATION PAGED/CALLED

Reason for Consultation: []  ESRD

Person Who was Notified: [] DEVEN

Consulting Physician: []   DR GERALDINE WILLIAMSON

Consultant Specialty: []  NEPHRO

Ordering Physician: []  DR CEDRIC TIPTON

## 2021-07-22 NOTE — NUR
KAUSHIK Bee HD nurse reports HD not progressing

KAUSHIK Bee was in contact w/ Dr. CEDRIC Davis and reported unable to run HD due to malfunction. Dr. Davis informed her to flush ports with Activase and stop HD tonight, she ordered HD for 
tomorrow.

## 2021-07-22 NOTE — NUR
Pt BIBA from [facility] for malfunctioning AV shunt. Per facility and pt 
she has become weak and sleeping alot which is not her normal. Pt is A&O at 
this time, no complaints of pain or sob. Per Pt last dialysis was Tuesday. Per 
dialysis center the fistula started malfunctioning [id number] ago.

## 2021-07-22 NOTE — NUR
Medication reconciliation completed with information provided by RONALDO SIMMONS. Any prior medication reconciliation on file was reviewed and corrected.

## 2021-07-22 NOTE — NUR
Opening note

Received patient awake, resting in bed. No distress,  bed is locked in lowest position, side 
rails up 2x, bed alarm on. She was instructed on use of call light.  Updated board.

## 2021-07-23 VITALS — SYSTOLIC BLOOD PRESSURE: 93 MMHG

## 2021-07-23 VITALS — SYSTOLIC BLOOD PRESSURE: 139 MMHG

## 2021-07-23 VITALS — SYSTOLIC BLOOD PRESSURE: 130 MMHG

## 2021-07-23 VITALS — SYSTOLIC BLOOD PRESSURE: 101 MMHG

## 2021-07-23 VITALS — SYSTOLIC BLOOD PRESSURE: 126 MMHG

## 2021-07-23 LAB
ANION GAP SERPL CALCULATED.3IONS-SCNC: 10 MMOL/L (ref 5–15)
BASOPHILS # BLD AUTO: 0 K/UL (ref 0–0.2)
BASOPHILS NFR BLD AUTO: 0.6 % (ref 0–2)
BUN SERPL-MCNC: 54 MG/DL (ref 8–21)
CALCIUM SERPL-MCNC: 9 MG/DL (ref 8.4–11)
CHLORIDE SERPL-SCNC: 107 MMOL/L (ref 98–107)
CREAT SERPL-MCNC: 5.35 MG/DL (ref 0.55–1.3)
EOSINOPHIL # BLD AUTO: 0.1 K/UL (ref 0–0.4)
EOSINOPHIL NFR BLD AUTO: 1.9 % (ref 0–4)
ERYTHROCYTE [DISTWIDTH] IN BLOOD BY AUTOMATED COUNT: 15.2 % (ref 9–15)
GFR SERPL CREATININE-BSD FRML MDRD: 10 ML/MIN (ref 90–?)
GLUCOSE SERPL-MCNC: 96 MG/DL (ref 70–99)
HCT VFR BLD AUTO: 37.6 % (ref 36–48)
HGB BLD-MCNC: 12.3 G/DL (ref 12–16)
LYMPHOCYTES # BLD AUTO: 1.1 K/UL (ref 1–5.5)
LYMPHOCYTES NFR BLD AUTO: 24.9 % (ref 20.5–51.5)
MCH RBC QN AUTO: 34 PG (ref 27–31)
MCHC RBC AUTO-ENTMCNC: 33 % (ref 32–36)
MCV RBC AUTO: 104 FL (ref 79–98)
MONOCYTES # BLD MANUAL: 0.3 K/UL (ref 0–1)
MONOCYTES # BLD MANUAL: 6.4 % (ref 1.7–9.3)
NEUTROPHILS # BLD AUTO: 2.9 K/UL (ref 1.8–7.7)
NEUTROPHILS NFR BLD AUTO: 66.2 % (ref 40–70)
PHOSPHATE SERPL-MCNC: 4 MG/DL (ref 2.7–4.5)
PLATELET # BLD AUTO: 193 K/UL (ref 130–430)
POTASSIUM SERPL-SCNC: 5.1 MMOL/L (ref 3.5–5.1)
RBC # BLD AUTO: 3.64 MIL/UL (ref 4.2–6.2)
SODIUM SERPLBLD-SCNC: 140 MMOL/L (ref 136–145)
WBC # BLD AUTO: 4.4 K/UL (ref 4.8–10.8)

## 2021-07-23 PROCEDURE — 05PYX3Z REMOVAL OF INFUSION DEVICE FROM UPPER VEIN, EXTERNAL APPROACH: ICD-10-PCS

## 2021-07-23 PROCEDURE — 0JH63XZ INSERTION OF TUNNELED VASCULAR ACCESS DEVICE INTO CHEST SUBCUTANEOUS TISSUE AND FASCIA, PERCUTANEOUS APPROACH: ICD-10-PCS

## 2021-07-23 PROCEDURE — B518ZZA FLUOROSCOPY OF SUPERIOR VENA CAVA, GUIDANCE: ICD-10-PCS

## 2021-07-23 PROCEDURE — B548ZZA ULTRASONOGRAPHY OF SUPERIOR VENA CAVA, GUIDANCE: ICD-10-PCS

## 2021-07-23 PROCEDURE — 5A1D70Z PERFORMANCE OF URINARY FILTRATION, INTERMITTENT, LESS THAN 6 HOURS PER DAY: ICD-10-PCS | Performed by: INTERNAL MEDICINE

## 2021-07-23 PROCEDURE — 02HV33Z INSERTION OF INFUSION DEVICE INTO SUPERIOR VENA CAVA, PERCUTANEOUS APPROACH: ICD-10-PCS

## 2021-07-23 RX ADMIN — HYDROCODONE BITARTRATE AND ACETAMINOPHEN PRN TAB: 10; 325 TABLET ORAL at 22:50

## 2021-07-23 RX ADMIN — PANTOPRAZOLE SODIUM SCH MG: 40 TABLET, DELAYED RELEASE ORAL at 08:47

## 2021-07-23 RX ADMIN — Medication SCH ML: at 14:00

## 2021-07-23 RX ADMIN — SUCRALFATE SCH GM: 1 TABLET ORAL at 21:22

## 2021-07-23 RX ADMIN — OXCARBAZEPINE SCH MG: 150 TABLET, FILM COATED ORAL at 21:22

## 2021-07-23 RX ADMIN — Medication SCH ML: at 21:21

## 2021-07-23 RX ADMIN — OXCARBAZEPINE SCH MG: 150 TABLET, FILM COATED ORAL at 08:47

## 2021-07-23 RX ADMIN — SIMVASTATIN SCH MG: 10 TABLET, FILM COATED ORAL at 21:22

## 2021-07-23 RX ADMIN — Medication SCH ML: at 06:41

## 2021-07-23 RX ADMIN — LEVOTHYROXINE SODIUM SCH MG: 100 TABLET ORAL at 08:47

## 2021-07-23 RX ADMIN — SUCRALFATE SCH GM: 1 TABLET ORAL at 08:46

## 2021-07-23 RX ADMIN — HYDROCODONE BITARTRATE AND ACETAMINOPHEN PRN TAB: 10; 325 TABLET ORAL at 17:58

## 2021-07-23 RX ADMIN — SUCRALFATE SCH GM: 1 TABLET ORAL at 16:13

## 2021-07-23 RX ADMIN — MIDODRINE HYDROCHLORIDE SCH MG: 5 TABLET ORAL at 08:48

## 2021-07-23 NOTE — NUR
b/p assessed.b/p status presents  increase in status.to continue to monitor.pt.apprised of 
the change in b/p status.

n/s bolus in progress.

## 2021-07-23 NOTE — NUR
b/p assessed.b/p remained low status. paged r/e b/p status. ordered 
additional ns bolus.i have initiated 

the administration of the additional ns bolus.i have noted  pt.is hemo-dialysis pt.b/p cuff 
remains location:rt.lower extremity.

## 2021-07-23 NOTE — NUR
CLOSING NOTES:

PATIENT EATING DINNER. NO SIGNS OF ACUTE DISTRESS NOTED. FALL AND SAFETY PRECAUTION 
REINFORCED.  IV WAS ACCIDENTALLY PULLED OUT BY THE PATIENT. BED LOCKED ALARM ON AND IN 
LOWEST POSITION. CALL LIGHT WITHIN REACH. WILL CONTINUE 

-------------------------------------------------------------------------------

Addendum: 07/23/21 at 1855 by Luis Miguel Moss RN

-------------------------------------------------------------------------------

WILL CONTINUE MONITOR UNTIL ENDORSE TO NIGHT SHIFT RN.

## 2021-07-23 NOTE — NUR
OPENING NOTES:

PATIENT RESTING IN BED. NO SIGNS OF ACUTE DISTRESS NOTED. FALL AND SAFETY AND SAFETY 
PRECAUTION REINFORCED. CALL LIGHT WITHIN REACH.

## 2021-07-23 NOTE — NUR
Nutrition Update



Alex Scale 16 noted.

Pt admitted for malfunctioning HD catheter.

Diet: renal

BMI: 32.9 kg/m2



RD to follow per nutrition care standards.

## 2021-07-23 NOTE — NUR
change of shift.pt,presents s/p perma-cath placement s/p hemo-dialysis post placement 
location;lr.chest.removal of rt.chest perma cath.1.5l removed per hemo-dialysis.pt.presents 
bedrest activity.pt.presents anuric elimination.general status stable.respiratory status 
stable@room air.call light/telephone w/in access of the pt.

## 2021-07-23 NOTE — NUR
Resting

Resting w/eyes closed, symmetrical rise and fall of chest, nonlabored breathing. Safety 
precautions in place and call light w/in reach.

## 2021-07-23 NOTE — NUR
i have administered norco:10/325mg po.b/p is stable wnl.to assess the efficacy of the pain 
medication per pain mgx protocol.

## 2021-07-23 NOTE — NUR
2100pmedications administered.pt.capable to ingest the po medications w/out difficulty.b/p 
status has increased wnl.

## 2021-07-23 NOTE — NUR
b/p assessed values presents wnl.s/p 2nd iv bolus administration.pt.repositioned.iv access 
intact iv lock.call light/telephone placed w/in access of the pt.trendelenberg position has 
been d/c.

## 2021-07-23 NOTE — NUR
pt.assessed.v/s assessed values note b/p status low per michel;rn/resource;rn.b/p assessed 
several x's note low status.;a paged r/e low b/p status per 
lj;rn/chg. has ordered ns bolus.shincharity has initiated the ns bolus michel;rn 
has placed the pt.in trendelenberg position.other v/s values wnl.pt.had presented pain 
location lt.chest location of the perma cath placement.  2/t the b/p status low to hold the 
administration of medication;pain@this hour.i have apprised the pt. that 2/t the b/p status 
i am to hold the pain medication administration.pt.repositioned.call light/telephone placed 
w/in access of the pt.

-------------------------------------------------------------------------------

Addendum: 07/24/21 at 0334 by Govind Gutierrez RN

-------------------------------------------------------------------------------

b/p cuff placed@several body locations.i have placed the b/p cuff rt.lower extremity.i have 
clarified w the pt.

pt.presents av-shunts;locations:rt/lt.bicepts;non-functioning.pt.presents rt.chest wall dsg 
d/c rt.perma cath:

rt.svc.07/23/21.dsg intact.

-------------------------------------------------------------------------------

Addendum: 07/24/21 at 0336 by Govind Gutierrez RN

-------------------------------------------------------------------------------

noted that pt.presents hemo-dialysis hx.to note iv bolus rate.

## 2021-07-23 NOTE — NUR
closing note

Resting in bed, no distress. Safety precautions in place, needs met throughout shift. Will 
endorse care

## 2021-07-23 NOTE — NUR
PATIENT BROUGHT TO OR:

PATIENT BROUGHT TO OR VIA GURNEY FOR PLACEMENT OF TUNNELED HEMODIALYSIS CATHETER. PATIENT IS 
IN STABLE CONDITION.

## 2021-07-23 NOTE — NUR
HIGH ALERT MED & LOW BP COMMUNICATION W/ GEETA MENDOZA DR., A. PAGED AT THIS TIME, PATIENT'S LOW BLOOD PRESSURE COMMUNICATED. AT THIS TIME, 
PATIENT'S SBP IS TRENDING MID 70'S AND LOW 80'S, HR IS WNL, PATIENT IS AS ALERT AS HER 
REPORTED BASELINE. GEETA MENDOZA ORDERED ONE TIME BOLUS OF  ML NOW, HE VERBALIZED TO 
CALL HIM BACK IF THE SBP IS <90 AFTER NS BOLUS GIVEN. ORDER READ BACK, VERIFIED, AND WILL BE 
CARRIED OUT IMMEDIATELY.

## 2021-07-23 NOTE — NUR
PATIENT BACK TO ROOM FROM OR:

RECEIVED PATIENT FROM OR RN VIA [facility]. PATIENT HAD PERMACATH UNDER C-ARM GUIDANCE AT LEFT 
CHEST/ LEFT SUBCLAVIAN. PATIENT IS IN STABLE CONDITION. DENIES ANY DISCOMFORT AT THIS TIME.

## 2021-07-24 VITALS — SYSTOLIC BLOOD PRESSURE: 118 MMHG

## 2021-07-24 VITALS — SYSTOLIC BLOOD PRESSURE: 124 MMHG

## 2021-07-24 VITALS — SYSTOLIC BLOOD PRESSURE: 106 MMHG

## 2021-07-24 VITALS — SYSTOLIC BLOOD PRESSURE: 103 MMHG

## 2021-07-24 VITALS — SYSTOLIC BLOOD PRESSURE: 142 MMHG

## 2021-07-24 LAB
ANION GAP SERPL CALCULATED.3IONS-SCNC: 6 MMOL/L (ref 5–15)
BASOPHILS # BLD AUTO: 0 K/UL (ref 0–0.2)
BASOPHILS NFR BLD AUTO: 0.6 % (ref 0–2)
BUN SERPL-MCNC: 48 MG/DL (ref 8–21)
CALCIUM SERPL-MCNC: 8.5 MG/DL (ref 8.4–11)
CHLORIDE SERPL-SCNC: 107 MMOL/L (ref 98–107)
CREAT SERPL-MCNC: 4.66 MG/DL (ref 0.55–1.3)
EOSINOPHIL # BLD AUTO: 0.1 K/UL (ref 0–0.4)
EOSINOPHIL NFR BLD AUTO: 1 % (ref 0–4)
ERYTHROCYTE [DISTWIDTH] IN BLOOD BY AUTOMATED COUNT: 15.1 % (ref 9–15)
GFR SERPL CREATININE-BSD FRML MDRD: 12 ML/MIN (ref 90–?)
GLUCOSE SERPL-MCNC: 94 MG/DL (ref 70–99)
HCT VFR BLD AUTO: 35.1 % (ref 36–48)
HGB BLD-MCNC: 12 G/DL (ref 12–16)
LYMPHOCYTES # BLD AUTO: 1.8 K/UL (ref 1–5.5)
LYMPHOCYTES NFR BLD AUTO: 33.6 % (ref 20.5–51.5)
MCH RBC QN AUTO: 35 PG (ref 27–31)
MCHC RBC AUTO-ENTMCNC: 34 % (ref 32–36)
MCV RBC AUTO: 104 FL (ref 79–98)
MONOCYTES # BLD MANUAL: 0.4 K/UL (ref 0–1)
MONOCYTES # BLD MANUAL: 8.3 % (ref 1.7–9.3)
NEUTROPHILS # BLD AUTO: 3 K/UL (ref 1.8–7.7)
NEUTROPHILS NFR BLD AUTO: 56.5 % (ref 40–70)
PLATELET # BLD AUTO: 166 K/UL (ref 130–430)
POTASSIUM SERPL-SCNC: 5.1 MMOL/L (ref 3.5–5.1)
RBC # BLD AUTO: 3.39 MIL/UL (ref 4.2–6.2)
SODIUM SERPLBLD-SCNC: 141 MMOL/L (ref 136–145)
WBC # BLD AUTO: 5.3 K/UL (ref 4.8–10.8)

## 2021-07-24 PROCEDURE — 5A1D70Z PERFORMANCE OF URINARY FILTRATION, INTERMITTENT, LESS THAN 6 HOURS PER DAY: ICD-10-PCS | Performed by: INTERNAL MEDICINE

## 2021-07-24 RX ADMIN — SUCRALFATE SCH GM: 1 TABLET ORAL at 16:23

## 2021-07-24 RX ADMIN — MIDODRINE HYDROCHLORIDE SCH MG: 5 TABLET ORAL at 08:32

## 2021-07-24 RX ADMIN — Medication SCH ML: at 06:10

## 2021-07-24 RX ADMIN — SUCRALFATE SCH GM: 1 TABLET ORAL at 08:33

## 2021-07-24 RX ADMIN — SIMVASTATIN SCH MG: 10 TABLET, FILM COATED ORAL at 22:14

## 2021-07-24 RX ADMIN — PANTOPRAZOLE SODIUM SCH MG: 40 TABLET, DELAYED RELEASE ORAL at 08:34

## 2021-07-24 RX ADMIN — OXCARBAZEPINE SCH MG: 150 TABLET, FILM COATED ORAL at 08:32

## 2021-07-24 RX ADMIN — Medication SCH ML: at 22:14

## 2021-07-24 RX ADMIN — Medication SCH ML: at 16:21

## 2021-07-24 RX ADMIN — SUCRALFATE SCH GM: 1 TABLET ORAL at 22:13

## 2021-07-24 RX ADMIN — OXCARBAZEPINE SCH MG: 150 TABLET, FILM COATED ORAL at 22:13

## 2021-07-24 RX ADMIN — HYDROCODONE BITARTRATE AND ACETAMINOPHEN PRN TAB: 10; 325 TABLET ORAL at 16:21

## 2021-07-24 RX ADMIN — LEVOTHYROXINE SODIUM SCH MG: 100 TABLET ORAL at 06:10

## 2021-07-24 RX ADMIN — HYDROCODONE BITARTRATE AND ACETAMINOPHEN PRN TAB: 10; 325 TABLET ORAL at 04:43

## 2021-07-24 NOTE — NUR
Initial note:

Patient is alert, oriented x4, denies any pain or discomfort at this time.

Left tunnel catheter for HD is with dry-clean dressing.

## 2021-07-24 NOTE — NUR
OPENING NOTES

Patient resting in bed - no s/s pain or distress noted. Respirations even and unlabored - 
head of bed elevated. IV site patent - no s/s redness, infection, or infiltration. Bed 
locked and in lowest position. Call light within reach - bed alarm on.

## 2021-07-24 NOTE — NUR
pt.assessed.v/s assessed values note b/p status wnl.no requests posited@this hour.no c/o 
pain,nausea.comfortable.pt.assessed 

for cleanliness.pt.repositioned.general status stable.respiratory status stable.call 
light/telephone placed w/in access of the pt.

## 2021-07-24 NOTE — NUR
pt.assessed.pt.presents quiescent affect;calm,somnolent.per flacc pain mgx pt.absent 
facial.grimaces/body posturing.

pt.assessed for cleanliness.pt.repositioned.iv access intact.call light/telephone placed 
w/in access of the pt.

## 2021-07-24 NOTE — NUR
Closing note:

Patient is stable, no sign of distress.

Patient tolerates Renal diet well, no N/V.

Patient has scheduled for HD today and House Supervisor made aware.

## 2021-07-24 NOTE — NUR
pt.assessed.v/s assessed note b/p status wnl.no c/o pain,nausea.iv access 
intact.chest:rt/lt.dsg intact.i have weighed 

the pt.:hemo-dialysis pt.per protocol.pt.repositioned.call light/telephone placed w/in 
access of the pt.

## 2021-07-24 NOTE — NUR
HEMODIALYSIS

Patient has hemodialysis started at this time. 

-------------------------------------------------------------------------------

Addendum: 07/25/21 at 0602 by Ranjit Tubbs RN

-------------------------------------------------------------------------------

Performed by KAUSHIK Menjivar Requested x2 5000u heparin and x2 1 liter NS bags.

## 2021-07-24 NOTE — NUR
ROUNDING NOTES

Patient resting in bed - no s/s pain or distress noted. Respirations even and unlabored - 
head of bed elevated. IV site patent - no s/s redness, infection, or infiltration. Bed 
locked and in lowest position. Call light within reach - bed alarm on.

## 2021-07-24 NOTE — NUR
pt.assisted to the [facility].pt.had requested medication;pain.i have administered norco:10/325mg 
po.to assess the efficacy of the pain medication per pain mgx protocol.

## 2021-07-25 VITALS — SYSTOLIC BLOOD PRESSURE: 96 MMHG

## 2021-07-25 VITALS — SYSTOLIC BLOOD PRESSURE: 101 MMHG

## 2021-07-25 VITALS — SYSTOLIC BLOOD PRESSURE: 106 MMHG

## 2021-07-25 LAB
ANION GAP SERPL CALCULATED.3IONS-SCNC: 7 MMOL/L (ref 5–15)
BASOPHILS # BLD AUTO: 0 K/UL (ref 0–0.2)
BASOPHILS NFR BLD AUTO: 0.8 % (ref 0–2)
BUN SERPL-MCNC: 35 MG/DL (ref 8–21)
CALCIUM SERPL-MCNC: 9 MG/DL (ref 8.4–11)
CHLORIDE SERPL-SCNC: 106 MMOL/L (ref 98–107)
CREAT SERPL-MCNC: 3.6 MG/DL (ref 0.55–1.3)
EOSINOPHIL # BLD AUTO: 0.1 K/UL (ref 0–0.4)
EOSINOPHIL NFR BLD AUTO: 1.6 % (ref 0–4)
ERYTHROCYTE [DISTWIDTH] IN BLOOD BY AUTOMATED COUNT: 15 % (ref 9–15)
GFR SERPL CREATININE-BSD FRML MDRD: 16 ML/MIN (ref 90–?)
GLUCOSE SERPL-MCNC: 92 MG/DL (ref 70–99)
HCT VFR BLD AUTO: 35.6 % (ref 36–48)
HGB BLD-MCNC: 11.8 G/DL (ref 12–16)
LYMPHOCYTES # BLD AUTO: 1.7 K/UL (ref 1–5.5)
LYMPHOCYTES NFR BLD AUTO: 31.2 % (ref 20.5–51.5)
MCH RBC QN AUTO: 34 PG (ref 27–31)
MCHC RBC AUTO-ENTMCNC: 33 % (ref 32–36)
MCV RBC AUTO: 103 FL (ref 79–98)
MONOCYTES # BLD MANUAL: 0.4 K/UL (ref 0–1)
MONOCYTES # BLD MANUAL: 7.7 % (ref 1.7–9.3)
NEUTROPHILS # BLD AUTO: 3.2 K/UL (ref 1.8–7.7)
NEUTROPHILS NFR BLD AUTO: 58.7 % (ref 40–70)
PHOSPHATE SERPL-MCNC: 2.8 MG/DL (ref 2.7–4.5)
PLATELET # BLD AUTO: 153 K/UL (ref 130–430)
POTASSIUM SERPL-SCNC: 4.4 MMOL/L (ref 3.5–5.1)
RBC # BLD AUTO: 3.47 MIL/UL (ref 4.2–6.2)
SODIUM SERPLBLD-SCNC: 142 MMOL/L (ref 136–145)
WBC # BLD AUTO: 5.5 K/UL (ref 4.8–10.8)

## 2021-07-25 RX ADMIN — PANTOPRAZOLE SODIUM SCH MG: 40 TABLET, DELAYED RELEASE ORAL at 08:26

## 2021-07-25 RX ADMIN — SUCRALFATE SCH GM: 1 TABLET ORAL at 08:25

## 2021-07-25 RX ADMIN — Medication SCH ML: at 06:27

## 2021-07-25 RX ADMIN — HYDROCODONE BITARTRATE AND ACETAMINOPHEN PRN TAB: 10; 325 TABLET ORAL at 08:23

## 2021-07-25 RX ADMIN — LEVOTHYROXINE SODIUM SCH MG: 100 TABLET ORAL at 06:27

## 2021-07-25 RX ADMIN — MIDODRINE HYDROCHLORIDE SCH MG: 5 TABLET ORAL at 08:25

## 2021-07-25 RX ADMIN — OXCARBAZEPINE SCH MG: 150 TABLET, FILM COATED ORAL at 10:56

## 2021-07-25 NOTE — NUR
Transportation arrange.

Patient is cleared for DC home<[facility]>, but patient has no family to pick her up, and 
facility where she lives does not provide the ride.

Call and arrange transportation at 13.30 PM.

## 2021-07-25 NOTE — NUR
D/C Patient

Patient given medication reconciliation form and D/C instructions. Patient verbalized 
understanding. MD discussed with patient the results and treatment provided. Ambulatory with 
steady gait for discharge to [facility]< assisted living>. Patient in stable condition, 
ID band removed. IV catheter removed, intact and dressing applied, no active 
bleeding.Patient educated on pain management. All belongings sent with patient.

Discharge patient via a gurney by ambulance .

## 2021-07-25 NOTE — NUR
Hemodialysis performed by KAUSHIK Delong

-------------------------------------------------------------------------------

Addendum: 07/25/21 at 0601 by Ranjit Tubbs RN

-------------------------------------------------------------------------------

INCORRECT DOCUMENTATION - SUPPOSED TO BE AMMENDED NOTE TO EARLIER NOTE

## 2021-07-25 NOTE — NUR
Initial note:

Patient is alert, oriented x4, denies any pain or discomfort. Asking for discharge home. 
Will F/U with Dr. Stone for DC ordered.

She is on D5 1/2 NS IVF at 100 ml/hr.

-------------------------------------------------------------------------------

Addendum: 07/25/21 at 0736 by Naheed Gomez RN

-------------------------------------------------------------------------------

Incorrect patient :

Pt has no IVF and Will F/U with Dr. Brent Gloria for DC order.

## 2021-07-25 NOTE — NUR
TRANSPORTATION ARRANGED

[facility] ambulance called for transportation to bring patient back to [place] spoke 
with gayla at [phone], scheduled  time for 1330.

## 2022-04-06 NOTE — NUR
CLOSING NOTES

Patient resting in bed - no s/s pain or distress noted. Respirations even and unlabored - 
head of bed elevated. IV site patent - no s/s redness, infection, or infiltration. Bed 
locked and in lowest position. Call light within reach - bed alarm on. 0 = understands/communicates without difficulty

## 2022-04-14 ENCOUNTER — HOSPITAL ENCOUNTER (EMERGENCY)
Dept: HOSPITAL 4 - SED | Age: 67
LOS: 1 days | Discharge: HOME | End: 2022-04-15
Payer: COMMERCIAL

## 2022-04-14 VITALS — WEIGHT: 185 LBS | BODY MASS INDEX: 31.58 KG/M2 | HEIGHT: 64 IN

## 2022-04-14 VITALS — SYSTOLIC BLOOD PRESSURE: 115 MMHG

## 2022-04-14 DIAGNOSIS — I10: ICD-10-CM

## 2022-04-14 DIAGNOSIS — N28.9: ICD-10-CM

## 2022-04-14 DIAGNOSIS — Z88.2: ICD-10-CM

## 2022-04-14 DIAGNOSIS — R07.89: Primary | ICD-10-CM

## 2022-04-14 DIAGNOSIS — G54.9: ICD-10-CM

## 2022-04-14 LAB
ALBUMIN SERPL BCP-MCNC: 3.3 G/DL (ref 3.4–4.8)
ALT SERPL W P-5'-P-CCNC: 15 U/L (ref 12–78)
ANION GAP SERPL CALCULATED.3IONS-SCNC: 7 MMOL/L (ref 5–15)
AST SERPL W P-5'-P-CCNC: 12 U/L (ref 10–37)
BASOPHILS # BLD AUTO: 0.1 K/UL (ref 0–0.2)
BASOPHILS NFR BLD AUTO: 0.8 % (ref 0–2)
BILIRUB SERPL-MCNC: 0.3 MG/DL (ref 0–1)
BUN SERPL-MCNC: 21 MG/DL (ref 8–21)
CALCIUM SERPL-MCNC: 8.6 MG/DL (ref 8.4–11)
CHLORIDE SERPL-SCNC: 104 MMOL/L (ref 98–107)
CREAT SERPL-MCNC: 3.29 MG/DL (ref 0.55–1.3)
EOSINOPHIL # BLD AUTO: 0.1 K/UL (ref 0–0.4)
EOSINOPHIL NFR BLD AUTO: 1 % (ref 0–4)
ERYTHROCYTE [DISTWIDTH] IN BLOOD BY AUTOMATED COUNT: 15.2 % (ref 9–15)
GFR SERPL CREATININE-BSD FRML MDRD: 18 ML/MIN (ref 90–?)
GLUCOSE SERPL-MCNC: 117 MG/DL (ref 70–99)
HCT VFR BLD AUTO: 35 % (ref 36–48)
HGB BLD-MCNC: 11.3 G/DL (ref 12–16)
LIPASE SERPL-CCNC: 213 U/L (ref 73–393)
LYMPHOCYTES # BLD AUTO: 1.1 K/UL (ref 1–5.5)
LYMPHOCYTES NFR BLD AUTO: 15.3 % (ref 20.5–51.5)
MCH RBC QN AUTO: 32 PG (ref 27–31)
MCHC RBC AUTO-ENTMCNC: 32 % (ref 32–36)
MCV RBC AUTO: 100 FL (ref 79–98)
MONOCYTES # BLD MANUAL: 0.5 K/UL (ref 0–1)
MONOCYTES # BLD MANUAL: 7.5 % (ref 1.7–9.3)
NEUTROPHILS # BLD AUTO: 5.3 K/UL (ref 1.8–7.7)
NEUTROPHILS NFR BLD AUTO: 75.4 % (ref 40–70)
PLATELET # BLD AUTO: 219 K/UL (ref 130–430)
POTASSIUM SERPL-SCNC: 4.2 MMOL/L (ref 3.5–5.1)
RBC # BLD AUTO: 3.52 MIL/UL (ref 4.2–6.2)
SODIUM SERPLBLD-SCNC: 137 MMOL/L (ref 136–145)
WBC # BLD AUTO: 7 K/UL (ref 4.8–10.8)

## 2022-04-14 PROCEDURE — 76376 3D RENDER W/INTRP POSTPROCES: CPT

## 2022-04-14 PROCEDURE — 80053 COMPREHEN METABOLIC PANEL: CPT

## 2022-04-14 PROCEDURE — 71250 CT THORAX DX C-: CPT

## 2022-04-14 PROCEDURE — 71045 X-RAY EXAM CHEST 1 VIEW: CPT

## 2022-04-14 PROCEDURE — 36415 COLL VENOUS BLD VENIPUNCTURE: CPT

## 2022-04-14 PROCEDURE — 76700 US EXAM ABDOM COMPLETE: CPT

## 2022-04-14 PROCEDURE — 96375 TX/PRO/DX INJ NEW DRUG ADDON: CPT

## 2022-04-14 PROCEDURE — 93005 ELECTROCARDIOGRAM TRACING: CPT

## 2022-04-14 PROCEDURE — 96374 THER/PROPH/DIAG INJ IV PUSH: CPT

## 2022-04-14 PROCEDURE — 84484 ASSAY OF TROPONIN QUANT: CPT

## 2022-04-14 PROCEDURE — 74176 CT ABD & PELVIS W/O CONTRAST: CPT

## 2022-04-14 PROCEDURE — 99285 EMERGENCY DEPT VISIT HI MDM: CPT

## 2022-04-14 PROCEDURE — 85025 COMPLETE CBC W/AUTO DIFF WBC: CPT

## 2022-04-14 PROCEDURE — 83690 ASSAY OF LIPASE: CPT

## 2022-04-14 PROCEDURE — 29105 APPLICATION LONG ARM SPLINT: CPT

## 2022-04-14 RX ADMIN — KETOROLAC TROMETHAMINE ONE MG: 30 INJECTION, SOLUTION INTRAMUSCULAR; INTRAVENOUS at 20:55

## 2022-04-14 RX ADMIN — Medication ONE MG: at 20:55

## 2022-04-14 RX ADMIN — FENTANYL CITRATE ONE MCG: 50 INJECTION, SOLUTION INTRAMUSCULAR; INTRAVENOUS at 20:56

## 2022-04-14 NOTE — NUR
Pt BIB [facility] d/t sharp, severe right rib pain that radiates to R side 
of back and shoulder with onset of last night.  Reports she had some episodes 
of SOB, but denies any chest pain/discomfort. Pt reports HX of HTN, bipolar, 
Kidney disease and receives HD on T/TH/S. Arrived to ED in no acute distress. 
Breathing adequately on RA. Reported to MD. 


-------------------------------------------------------------------------------

Addendum: 04/14/22 at 2202 by [email or web address]

-------------------------------------------------------------------------------

KAUSHIK Fleming

## 2022-04-15 VITALS — SYSTOLIC BLOOD PRESSURE: 104 MMHG

## 2022-04-15 RX ADMIN — MORPHINE SULFATE ONE MG: 4 INJECTION INTRAVENOUS at 00:21

## 2022-04-15 RX ADMIN — DEXAMETHASONE SODIUM PHOSPHATE ONE MG: 10 INJECTION INTRAMUSCULAR; INTRAVENOUS at 00:20

## 2022-04-15 NOTE — NUR
Patient given written and verbal discharge instructions and verbalizes 
understanding.  ER MD Cruz discussed with patient the results and 
treatment provided. Patient in stable condition. ID arm band removed. IV 
catheter removed intact and dressing applied, no active bleeding.

Rx of Flexeril, Norco and Predisone sent to pharmacy of choice. Patient 
educated on pain management and to follow up with PMD. Pain Scale 0/10.

Opportunity for questions provided and answered. Medication side effect fact 
sheet provided. S transportation provided back to assisted living. Assisted 
living made aware of pt returning at this time.

## 2022-04-15 NOTE — NUR
Pt ready for discharge. Pending transportation via S ambulance.  Resting 
comfortably in bed at this time.

## 2024-10-23 ENCOUNTER — HOSPITAL ENCOUNTER (INPATIENT)
Dept: HOSPITAL 26 - MED | Age: 69
LOS: 2 days | Discharge: NURSING, REHAB OR LONG-TERM CARE | DRG: 698 | End: 2024-10-25
Attending: STUDENT IN AN ORGANIZED HEALTH CARE EDUCATION/TRAINING PROGRAM | Admitting: STUDENT IN AN ORGANIZED HEALTH CARE EDUCATION/TRAINING PROGRAM
Payer: COMMERCIAL

## 2024-10-23 VITALS — HEART RATE: 63 BPM | OXYGEN SATURATION: 99 %

## 2024-10-23 VITALS
SYSTOLIC BLOOD PRESSURE: 124 MMHG | HEART RATE: 63 BPM | DIASTOLIC BLOOD PRESSURE: 42 MMHG | OXYGEN SATURATION: 99 % | TEMPERATURE: 96.6 F | RESPIRATION RATE: 18 BRPM

## 2024-10-23 VITALS
HEART RATE: 66 BPM | SYSTOLIC BLOOD PRESSURE: 113 MMHG | OXYGEN SATURATION: 96 % | RESPIRATION RATE: 18 BRPM | DIASTOLIC BLOOD PRESSURE: 64 MMHG | TEMPERATURE: 97.3 F

## 2024-10-23 VITALS — HEIGHT: 64 IN | WEIGHT: 159.06 LBS | BODY MASS INDEX: 27.16 KG/M2

## 2024-10-23 VITALS — HEART RATE: 61 BPM

## 2024-10-23 DIAGNOSIS — E78.5: ICD-10-CM

## 2024-10-23 DIAGNOSIS — E83.51: ICD-10-CM

## 2024-10-23 DIAGNOSIS — Z79.899: ICD-10-CM

## 2024-10-23 DIAGNOSIS — I12.0: ICD-10-CM

## 2024-10-23 DIAGNOSIS — E83.39: ICD-10-CM

## 2024-10-23 DIAGNOSIS — T82.41XA: Primary | ICD-10-CM

## 2024-10-23 DIAGNOSIS — E03.9: ICD-10-CM

## 2024-10-23 DIAGNOSIS — D63.1: ICD-10-CM

## 2024-10-23 DIAGNOSIS — N17.0: ICD-10-CM

## 2024-10-23 DIAGNOSIS — Z99.2: ICD-10-CM

## 2024-10-23 DIAGNOSIS — K21.9: ICD-10-CM

## 2024-10-23 DIAGNOSIS — N18.6: ICD-10-CM

## 2024-10-23 LAB
ANION GAP SERPL CALCULATED.3IONS-SCNC: 16.9 MMOL/L (ref 8–16)
APTT PPP: 30.9 SECS (ref 22–35.6)
BASOPHILS # BLD AUTO: 0.1 K/UL (ref 0–0.22)
BASOPHILS NFR BLD AUTO: 1 % (ref 0–2)
BUN SERPL-MCNC: 40 MG/DL (ref 7–18)
CALCIUM SPEC-MCNC: 7.8 MG/DL (ref 8.5–10.1)
CHLORIDE SERPL-SCNC: 96 MMOL/L (ref 98–107)
CO2 SERPL-SCNC: 22.2 MMOL/L (ref 21–32)
CREAT SERPL-MCNC: 4.6 MG/DL (ref 0.6–1.3)
EOSINOPHIL # BLD AUTO: 0.1 K/UL (ref 0–0.4)
EOSINOPHIL NFR BLD AUTO: 1.6 % (ref 0–4)
ERYTHROCYTE [DISTWIDTH] IN BLOOD BY AUTOMATED COUNT: 15 % (ref 11.6–13.7)
GFR SERPL CREATININE-BSD FRML MDRD: 12 ML/MIN (ref 90–?)
GLUCOSE SERPL-MCNC: 107 MG/DL (ref 74–106)
HCT VFR BLD AUTO: 32.4 % (ref 36–48)
HGB BLD-MCNC: 10.7 G/DL (ref 12–16)
INR PPP: 0.9 (ref 0.8–1.2)
LYMPHOCYTES # BLD AUTO: 2.1 K/UL (ref 2.5–16.5)
LYMPHOCYTES NFR BLD AUTO: 37.2 % (ref 20.5–51.1)
MCH RBC QN AUTO: 33 PG (ref 27–31)
MCHC RBC AUTO-ENTMCNC: 33 G/DL (ref 33–37)
MCV RBC AUTO: 99.5 FL (ref 80–94)
MONOCYTES # BLD AUTO: 0.5 K/UL (ref 0.8–1)
MONOCYTES NFR BLD AUTO: 9.6 % (ref 1.7–9.3)
NEUTROPHILS # BLD AUTO: 2.8 K/UL (ref 1.8–7.7)
NEUTROPHILS NFR BLD AUTO: 50.6 % (ref 42.2–75.2)
PLATELET # BLD AUTO: 196 K/UL (ref 140–450)
POTASSIUM SERPL-SCNC: 4.1 MMOL/L (ref 3.5–5.1)
PROTHROMBIN TIME: 9.6 SECS (ref 10.8–13.4)
RBC # BLD AUTO: 3.26 MIL/UL (ref 4.2–5.4)
SODIUM SERPL-SCNC: 131 MMOL/L (ref 136–145)
WBC # BLD AUTO: 5.6 K/UL (ref 4.8–10.8)

## 2024-10-23 PROCEDURE — P9046 ALBUMIN (HUMAN), 25%, 20 ML: HCPCS

## 2024-10-24 VITALS
SYSTOLIC BLOOD PRESSURE: 102 MMHG | HEART RATE: 64 BPM | DIASTOLIC BLOOD PRESSURE: 61 MMHG | TEMPERATURE: 97.6 F | RESPIRATION RATE: 18 BRPM | OXYGEN SATURATION: 97 %

## 2024-10-24 VITALS
HEART RATE: 63 BPM | SYSTOLIC BLOOD PRESSURE: 113 MMHG | DIASTOLIC BLOOD PRESSURE: 57 MMHG | TEMPERATURE: 96.8 F | RESPIRATION RATE: 18 BRPM | OXYGEN SATURATION: 100 %

## 2024-10-24 VITALS
SYSTOLIC BLOOD PRESSURE: 115 MMHG | HEART RATE: 60 BPM | RESPIRATION RATE: 20 BRPM | OXYGEN SATURATION: 99 % | DIASTOLIC BLOOD PRESSURE: 63 MMHG | TEMPERATURE: 96.8 F

## 2024-10-24 VITALS
DIASTOLIC BLOOD PRESSURE: 66 MMHG | OXYGEN SATURATION: 99 % | HEART RATE: 57 BPM | RESPIRATION RATE: 20 BRPM | SYSTOLIC BLOOD PRESSURE: 112 MMHG | TEMPERATURE: 96.6 F

## 2024-10-24 VITALS
DIASTOLIC BLOOD PRESSURE: 36 MMHG | OXYGEN SATURATION: 99 % | RESPIRATION RATE: 18 BRPM | SYSTOLIC BLOOD PRESSURE: 110 MMHG | HEART RATE: 61 BPM | TEMPERATURE: 97.7 F

## 2024-10-24 VITALS — HEART RATE: 58 BPM

## 2024-10-24 VITALS
RESPIRATION RATE: 18 BRPM | DIASTOLIC BLOOD PRESSURE: 52 MMHG | SYSTOLIC BLOOD PRESSURE: 108 MMHG | HEART RATE: 69 BPM | OXYGEN SATURATION: 98 % | TEMPERATURE: 97.7 F

## 2024-10-24 VITALS — HEART RATE: 62 BPM

## 2024-10-24 VITALS — OXYGEN SATURATION: 96 %

## 2024-10-24 LAB
ALBUMIN FLD-MCNC: 3.3 G/DL (ref 3.4–5)
ALP SERPL-CCNC: 211 U/L (ref 50–136)
ALT SERPL-CCNC: 26 U/L (ref 12–78)
AMPHET UR-MCNC: NEGATIVE NG/ML
AMYLASE SERPL-CCNC: 83 U/L (ref 25–115)
ANION GAP SERPL CALCULATED.3IONS-SCNC: 13.7 MMOL/L (ref 8–16)
ANION GAP SERPL CALCULATED.3IONS-SCNC: 16 MMOL/L (ref 8–16)
APPEARANCE UR: (no result)
APTT PPP: 25.3 SECS (ref 22–35.6)
AST SERPL-CCNC: 17 U/L (ref 15–37)
BACTERIA URNS QL MICRO: (no result) /HPF
BARBITURATES UR QL SCN: NEGATIVE NG/ML
BASOPHILS # BLD AUTO: 0 K/UL (ref 0–0.22)
BASOPHILS # BLD AUTO: 0 K/UL (ref 0–0.22)
BASOPHILS NFR BLD AUTO: 0.5 % (ref 0–2)
BASOPHILS NFR BLD AUTO: 0.6 % (ref 0–2)
BENZODIAZ UR QL SCN: NEGATIVE NG/ML
BILIRUB SERPL-MCNC: 0.3 MG/DL (ref 0–1)
BILIRUB UR QL STRIP: NEGATIVE
BUN SERPL-MCNC: 84 MG/DL (ref 7–18)
BUN SERPL-MCNC: 87 MG/DL (ref 7–18)
BZE UR QL SCN: NEGATIVE NG/ML
CALCIUM SPEC-MCNC: 7.8 MG/DL (ref 8.5–10.1)
CALCIUM SPEC-MCNC: 8.3 MG/DL (ref 8.5–10.1)
CANNABINOIDS UR QL SCN: NEGATIVE NG/ML
CHLORIDE SERPL-SCNC: 102 MMOL/L (ref 98–107)
CHLORIDE SERPL-SCNC: 102 MMOL/L (ref 98–107)
CHOLEST SERPL-MCNC: 174 MG/DL (ref ?–200)
CHOLEST/HDLC SERPL: 3.2 {RATIO} (ref 1–4.5)
CO2 SERPL-SCNC: 23.7 MMOL/L (ref 21–32)
CO2 SERPL-SCNC: 25.6 MMOL/L (ref 21–32)
COLOR UR: YELLOW
CREAT SERPL-MCNC: 5.4 MG/DL (ref 0.6–1.3)
CREAT SERPL-MCNC: 5.5 MG/DL (ref 0.6–1.3)
EOSINOPHIL # BLD AUTO: 0.1 K/UL (ref 0–0.4)
EOSINOPHIL # BLD AUTO: 0.1 K/UL (ref 0–0.4)
EOSINOPHIL NFR BLD AUTO: 1.6 % (ref 0–4)
EOSINOPHIL NFR BLD AUTO: 1.7 % (ref 0–4)
ERYTHROCYTE [DISTWIDTH] IN BLOOD BY AUTOMATED COUNT: 14.7 % (ref 11.6–13.7)
ERYTHROCYTE [DISTWIDTH] IN BLOOD BY AUTOMATED COUNT: 14.9 % (ref 11.6–13.7)
GFR SERPL CREATININE-BSD FRML MDRD: 10 ML/MIN (ref 90–?)
GFR SERPL CREATININE-BSD FRML MDRD: 10 ML/MIN (ref 90–?)
GLUCOSE SERPL-MCNC: 134 MG/DL (ref 74–106)
GLUCOSE SERPL-MCNC: 97 MG/DL (ref 74–106)
GLUCOSE UR STRIP-MCNC: NEGATIVE MG/DL
HCT VFR BLD AUTO: 31.9 % (ref 36–48)
HCT VFR BLD AUTO: 32.2 % (ref 36–48)
HDLC SERPL-MCNC: 54 MG/DL (ref 40–60)
HGB BLD-MCNC: 10.4 G/DL (ref 12–16)
HGB BLD-MCNC: 10.5 G/DL (ref 12–16)
HGB UR QL STRIP: (no result)
INR PPP: 0.98 (ref 0.8–1.2)
LACTATE PLASV-SCNC: 0.9 MMOL/L (ref 0.4–2)
LDLC SERPL CALC-MCNC: 101 MG/DL (ref 60–100)
LEUKOCYTE ESTERASE UR QL STRIP: (no result)
LIPASE SERPL-CCNC: 119 U/L (ref 16–77)
LYMPHOCYTES # BLD AUTO: 1.3 K/UL (ref 2.5–16.5)
LYMPHOCYTES # BLD AUTO: 1.3 K/UL (ref 2.5–16.5)
LYMPHOCYTES NFR BLD AUTO: 26.6 % (ref 20.5–51.1)
LYMPHOCYTES NFR BLD AUTO: 28.6 % (ref 20.5–51.1)
MAGNESIUM SERPL-MCNC: 2.1 MG/DL (ref 1.8–2.4)
MCH RBC QN AUTO: 33 PG (ref 27–31)
MCH RBC QN AUTO: 33 PG (ref 27–31)
MCHC RBC AUTO-ENTMCNC: 33 G/DL (ref 33–37)
MCHC RBC AUTO-ENTMCNC: 33 G/DL (ref 33–37)
MCV RBC AUTO: 100.3 FL (ref 80–94)
MCV RBC AUTO: 100.4 FL (ref 80–94)
MONOCYTES # BLD AUTO: 0.3 K/UL (ref 0.8–1)
MONOCYTES # BLD AUTO: 0.4 K/UL (ref 0.8–1)
MONOCYTES NFR BLD AUTO: 7.5 % (ref 1.7–9.3)
MONOCYTES NFR BLD AUTO: 8.2 % (ref 1.7–9.3)
NEUTROPHILS # BLD AUTO: 2.8 K/UL (ref 1.8–7.7)
NEUTROPHILS # BLD AUTO: 3.1 K/UL (ref 1.8–7.7)
NEUTROPHILS NFR BLD AUTO: 61.6 % (ref 42.2–75.2)
NEUTROPHILS NFR BLD AUTO: 63.1 % (ref 42.2–75.2)
NITRITE UR QL STRIP: NEGATIVE
OPIATES UR QL SCN: NEGATIVE NG/ML
PCP UR QL SCN: NEGATIVE NG/ML
PH UR STRIP: 6 [PH] (ref 5–9)
PHOSPHATE SERPL-MCNC: 5.5 MG/DL (ref 2.5–4.9)
PLATELET # BLD AUTO: 193 K/UL (ref 140–450)
PLATELET # BLD AUTO: 199 K/UL (ref 140–450)
POTASSIUM SERPL-SCNC: 4.3 MMOL/L (ref 3.5–5.1)
POTASSIUM SERPL-SCNC: 4.7 MMOL/L (ref 3.5–5.1)
PROT SERPL-MCNC: 6.6 G/DL (ref 6.4–8.2)
PROT UR QL STRIP: (no result)
PROTHROMBIN TIME: 10.3 SECS (ref 10.8–13.4)
RBC # BLD AUTO: 3.18 MIL/UL (ref 4.2–5.4)
RBC # BLD AUTO: 3.21 MIL/UL (ref 4.2–5.4)
RBC #/AREA URNS HPF: (no result) /HPF (ref 0–5)
SODIUM SERPL-SCNC: 137 MMOL/L (ref 136–145)
SODIUM SERPL-SCNC: 137 MMOL/L (ref 136–145)
SP GR UR STRIP: 1.01 (ref 1–1.03)
SQUAMOUS URNS QL MICRO: (no result) /LPF
T4 FREE SERPL-MCNC: 0.41 NG/DL (ref 0.76–1.46)
TRIGL SERPL-MCNC: 98 MG/DL (ref 30–150)
TSH SERPL DL<=0.05 MIU/L-ACNC: 1.1 UIU/ML (ref 0.34–3.74)
UROBILINOGEN UR STRIP-MCNC: 0.2 EU/DL (ref 0.2–1)
WBC # BLD AUTO: 4.6 K/UL (ref 4.8–10.8)
WBC # BLD AUTO: 4.9 K/UL (ref 4.8–10.8)
WBC,URINE: (no result) /HPF (ref 0–5)

## 2024-10-24 PROCEDURE — 5A1D70Z PERFORMANCE OF URINARY FILTRATION, INTERMITTENT, LESS THAN 6 HOURS PER DAY: ICD-10-PCS

## 2024-10-24 RX ADMIN — ALBUMIN (HUMAN) SCH MLS/HR: 12.5 SOLUTION INTRAVENOUS at 17:03

## 2024-10-24 RX ADMIN — SIMVASTATIN SCH MG: 10 TABLET, FILM COATED ORAL at 20:54

## 2024-10-24 RX ADMIN — ALBUMIN (HUMAN) ONE MLS/HR: 12.5 SOLUTION INTRAVENOUS at 17:02

## 2024-10-24 RX ADMIN — SODIUM CHLORIDE SCH MLS/HR: 9 INJECTION, SOLUTION INTRAVENOUS at 11:20

## 2024-10-24 RX ADMIN — DOCUSATE SODIUM SCH MG: 100 CAPSULE, LIQUID FILLED ORAL at 20:54

## 2024-10-25 VITALS
SYSTOLIC BLOOD PRESSURE: 112 MMHG | RESPIRATION RATE: 18 BRPM | OXYGEN SATURATION: 99 % | HEART RATE: 71 BPM | DIASTOLIC BLOOD PRESSURE: 53 MMHG | TEMPERATURE: 97.7 F

## 2024-10-25 VITALS
HEART RATE: 57 BPM | RESPIRATION RATE: 18 BRPM | TEMPERATURE: 97.2 F | OXYGEN SATURATION: 95 % | SYSTOLIC BLOOD PRESSURE: 110 MMHG | DIASTOLIC BLOOD PRESSURE: 56 MMHG

## 2024-10-25 VITALS — OXYGEN SATURATION: 98 %

## 2024-10-25 VITALS
SYSTOLIC BLOOD PRESSURE: 99 MMHG | TEMPERATURE: 97.8 F | HEART RATE: 61 BPM | RESPIRATION RATE: 18 BRPM | DIASTOLIC BLOOD PRESSURE: 51 MMHG | OXYGEN SATURATION: 99 %

## 2024-10-25 VITALS
DIASTOLIC BLOOD PRESSURE: 61 MMHG | HEART RATE: 69 BPM | RESPIRATION RATE: 18 BRPM | OXYGEN SATURATION: 97 % | TEMPERATURE: 97 F | SYSTOLIC BLOOD PRESSURE: 113 MMHG

## 2024-10-25 VITALS
SYSTOLIC BLOOD PRESSURE: 121 MMHG | HEART RATE: 67 BPM | TEMPERATURE: 96.9 F | OXYGEN SATURATION: 97 % | DIASTOLIC BLOOD PRESSURE: 62 MMHG | RESPIRATION RATE: 18 BRPM

## 2024-10-25 VITALS
DIASTOLIC BLOOD PRESSURE: 57 MMHG | HEART RATE: 58 BPM | TEMPERATURE: 97.8 F | RESPIRATION RATE: 18 BRPM | SYSTOLIC BLOOD PRESSURE: 99 MMHG

## 2024-10-25 LAB
ANION GAP SERPL CALCULATED.3IONS-SCNC: 14.8 MMOL/L (ref 8–16)
BASOPHILS # BLD AUTO: 0 K/UL (ref 0–0.22)
BASOPHILS NFR BLD AUTO: 0.6 % (ref 0–2)
BUN SERPL-MCNC: 29 MG/DL (ref 7–18)
CALCIUM SPEC-MCNC: 8.2 MG/DL (ref 8.5–10.1)
CHLORIDE SERPL-SCNC: 103 MMOL/L (ref 98–107)
CO2 SERPL-SCNC: 26.8 MMOL/L (ref 21–32)
CREAT SERPL-MCNC: 3.6 MG/DL (ref 0.6–1.3)
EOSINOPHIL # BLD AUTO: 0.1 K/UL (ref 0–0.4)
EOSINOPHIL NFR BLD AUTO: 2.3 % (ref 0–4)
ERYTHROCYTE [DISTWIDTH] IN BLOOD BY AUTOMATED COUNT: 15.1 % (ref 11.6–13.7)
GFR SERPL CREATININE-BSD FRML MDRD: 16 ML/MIN (ref 90–?)
GLUCOSE SERPL-MCNC: 94 MG/DL (ref 74–106)
HCT VFR BLD AUTO: 31.5 % (ref 36–48)
HGB BLD-MCNC: 10.2 G/DL (ref 12–16)
LYMPHOCYTES # BLD AUTO: 1.3 K/UL (ref 2.5–16.5)
LYMPHOCYTES NFR BLD AUTO: 27.9 % (ref 20.5–51.1)
MAGNESIUM SERPL-MCNC: 1.9 MG/DL (ref 1.8–2.4)
MCH RBC QN AUTO: 33 PG (ref 27–31)
MCHC RBC AUTO-ENTMCNC: 33 G/DL (ref 33–37)
MCV RBC AUTO: 100.5 FL (ref 80–94)
MONOCYTES # BLD AUTO: 0.4 K/UL (ref 0.8–1)
MONOCYTES NFR BLD AUTO: 7.9 % (ref 1.7–9.3)
NEUTROPHILS # BLD AUTO: 2.8 K/UL (ref 1.8–7.7)
NEUTROPHILS NFR BLD AUTO: 61.3 % (ref 42.2–75.2)
PHOSPHATE SERPL-MCNC: 4.1 MG/DL (ref 2.5–4.9)
PLATELET # BLD AUTO: 178 K/UL (ref 140–450)
POTASSIUM SERPL-SCNC: 4.6 MMOL/L (ref 3.5–5.1)
RBC # BLD AUTO: 3.13 MIL/UL (ref 4.2–5.4)
SODIUM SERPL-SCNC: 140 MMOL/L (ref 136–145)
WBC # BLD AUTO: 4.6 K/UL (ref 4.8–10.8)

## 2024-10-25 RX ADMIN — Medication SCH TAB: at 08:57

## 2024-10-25 RX ADMIN — PANTOPRAZOLE SODIUM SCH MG: 40 INJECTION, POWDER, FOR SOLUTION INTRAVENOUS at 08:55

## 2024-10-25 RX ADMIN — LEVOTHYROXINE SODIUM SCH MG: 75 TABLET ORAL at 08:57
